# Patient Record
Sex: FEMALE | Race: OTHER | HISPANIC OR LATINO | Employment: PART TIME | URBAN - METROPOLITAN AREA
[De-identification: names, ages, dates, MRNs, and addresses within clinical notes are randomized per-mention and may not be internally consistent; named-entity substitution may affect disease eponyms.]

---

## 2019-04-04 ENCOUNTER — APPOINTMENT (EMERGENCY)
Dept: RADIOLOGY | Facility: HOSPITAL | Age: 64
End: 2019-04-04
Payer: COMMERCIAL

## 2019-04-04 ENCOUNTER — HOSPITAL ENCOUNTER (OUTPATIENT)
Facility: HOSPITAL | Age: 64
Setting detail: OBSERVATION
Discharge: HOME | End: 2019-04-05
Attending: EMERGENCY MEDICINE | Admitting: INTERNAL MEDICINE
Payer: COMMERCIAL

## 2019-04-04 DIAGNOSIS — R55 SYNCOPE: Primary | ICD-10-CM

## 2019-04-04 PROBLEM — E87.6 HYPOKALEMIA: Status: ACTIVE | Noted: 2019-04-04

## 2019-04-04 PROBLEM — R53.1 GENERALIZED WEAKNESS: Status: ACTIVE | Noted: 2019-04-04

## 2019-04-04 LAB
ALBUMIN SERPL BCP-MCNC: 3.7 G/DL (ref 3.5–5)
ALP SERPL-CCNC: 76 U/L (ref 46–116)
ALT SERPL W P-5'-P-CCNC: 31 U/L (ref 12–78)
ANION GAP SERPL CALCULATED.3IONS-SCNC: 8 MMOL/L (ref 4–13)
APTT PPP: 28 SECONDS (ref 26–38)
AST SERPL W P-5'-P-CCNC: 25 U/L (ref 5–45)
BASOPHILS # BLD AUTO: 0.08 THOUSANDS/ΜL (ref 0–0.1)
BASOPHILS NFR BLD AUTO: 1 % (ref 0–1)
BILIRUB DIRECT SERPL-MCNC: 0.1 MG/DL (ref 0–0.2)
BILIRUB SERPL-MCNC: 0.3 MG/DL (ref 0.2–1)
BILIRUB UR QL STRIP: NEGATIVE
BUN SERPL-MCNC: 20 MG/DL (ref 5–25)
CALCIUM SERPL-MCNC: 9.3 MG/DL (ref 8.3–10.1)
CHLORIDE SERPL-SCNC: 104 MMOL/L (ref 100–108)
CLARITY UR: CLEAR
CO2 SERPL-SCNC: 30 MMOL/L (ref 21–32)
COLOR UR: NORMAL
CREAT SERPL-MCNC: 0.67 MG/DL (ref 0.6–1.3)
EOSINOPHIL # BLD AUTO: 0.86 THOUSAND/ΜL (ref 0–0.61)
EOSINOPHIL NFR BLD AUTO: 7 % (ref 0–6)
ERYTHROCYTE [DISTWIDTH] IN BLOOD BY AUTOMATED COUNT: 12.5 % (ref 11.6–15.1)
GFR SERPL CREATININE-BSD FRML MDRD: 93 ML/MIN/1.73SQ M
GLUCOSE SERPL-MCNC: 125 MG/DL (ref 65–140)
GLUCOSE SERPL-MCNC: 84 MG/DL (ref 65–140)
GLUCOSE UR STRIP-MCNC: NEGATIVE MG/DL
HCT VFR BLD AUTO: 42.4 % (ref 34.8–46.1)
HGB BLD-MCNC: 14 G/DL (ref 11.5–15.4)
HGB UR QL STRIP.AUTO: NEGATIVE
IMM GRANULOCYTES # BLD AUTO: 0.03 THOUSAND/UL (ref 0–0.2)
IMM GRANULOCYTES NFR BLD AUTO: 0 % (ref 0–2)
INR PPP: 0.96 (ref 0.86–1.16)
KETONES UR STRIP-MCNC: NEGATIVE MG/DL
LEUKOCYTE ESTERASE UR QL STRIP: NEGATIVE
LIPASE SERPL-CCNC: 139 U/L (ref 73–393)
LYMPHOCYTES # BLD AUTO: 3.49 THOUSANDS/ΜL (ref 0.6–4.47)
LYMPHOCYTES NFR BLD AUTO: 29 % (ref 14–44)
MAGNESIUM SERPL-MCNC: 1.8 MG/DL (ref 1.6–2.6)
MCH RBC QN AUTO: 30.4 PG (ref 26.8–34.3)
MCHC RBC AUTO-ENTMCNC: 33 G/DL (ref 31.4–37.4)
MCV RBC AUTO: 92 FL (ref 82–98)
MONOCYTES # BLD AUTO: 0.83 THOUSAND/ΜL (ref 0.17–1.22)
MONOCYTES NFR BLD AUTO: 7 % (ref 4–12)
NEUTROPHILS # BLD AUTO: 6.68 THOUSANDS/ΜL (ref 1.85–7.62)
NEUTS SEG NFR BLD AUTO: 56 % (ref 43–75)
NITRITE UR QL STRIP: NEGATIVE
NRBC BLD AUTO-RTO: 0 /100 WBCS
NT-PROBNP SERPL-MCNC: 67 PG/ML
PH UR STRIP.AUTO: 7 [PH]
PLATELET # BLD AUTO: 366 THOUSANDS/UL (ref 149–390)
PMV BLD AUTO: 10.4 FL (ref 8.9–12.7)
POTASSIUM SERPL-SCNC: 3 MMOL/L (ref 3.5–5.3)
PROT SERPL-MCNC: 7.5 G/DL (ref 6.4–8.2)
PROT UR STRIP-MCNC: NEGATIVE MG/DL
PROTHROMBIN TIME: 10.1 SECONDS (ref 9.4–11.7)
RBC # BLD AUTO: 4.6 MILLION/UL (ref 3.81–5.12)
SODIUM SERPL-SCNC: 142 MMOL/L (ref 136–145)
SP GR UR STRIP.AUTO: 1.01 (ref 1–1.03)
TROPONIN I SERPL-MCNC: 0.02 NG/ML
TROPONIN I SERPL-MCNC: <0.02 NG/ML
TSH SERPL DL<=0.05 MIU/L-ACNC: 1.65 UIU/ML (ref 0.36–3.74)
UROBILINOGEN UR QL STRIP.AUTO: 0.2 E.U./DL
VIT B12 SERPL-MCNC: 537 PG/ML (ref 100–900)
WBC # BLD AUTO: 11.97 THOUSAND/UL (ref 4.31–10.16)

## 2019-04-04 PROCEDURE — 96374 THER/PROPH/DIAG INJ IV PUSH: CPT

## 2019-04-04 PROCEDURE — 82948 REAGENT STRIP/BLOOD GLUCOSE: CPT

## 2019-04-04 PROCEDURE — 82607 VITAMIN B-12: CPT | Performed by: INTERNAL MEDICINE

## 2019-04-04 PROCEDURE — 83880 ASSAY OF NATRIURETIC PEPTIDE: CPT | Performed by: EMERGENCY MEDICINE

## 2019-04-04 PROCEDURE — 36415 COLL VENOUS BLD VENIPUNCTURE: CPT | Performed by: EMERGENCY MEDICINE

## 2019-04-04 PROCEDURE — 81003 URINALYSIS AUTO W/O SCOPE: CPT | Performed by: EMERGENCY MEDICINE

## 2019-04-04 PROCEDURE — 99285 EMERGENCY DEPT VISIT HI MDM: CPT

## 2019-04-04 PROCEDURE — 93005 ELECTROCARDIOGRAM TRACING: CPT

## 2019-04-04 PROCEDURE — 84484 ASSAY OF TROPONIN QUANT: CPT | Performed by: EMERGENCY MEDICINE

## 2019-04-04 PROCEDURE — 99220 PR INITIAL OBSERVATION CARE/DAY 70 MINUTES: CPT | Performed by: INTERNAL MEDICINE

## 2019-04-04 PROCEDURE — 84443 ASSAY THYROID STIM HORMONE: CPT | Performed by: EMERGENCY MEDICINE

## 2019-04-04 PROCEDURE — 83735 ASSAY OF MAGNESIUM: CPT | Performed by: EMERGENCY MEDICINE

## 2019-04-04 PROCEDURE — 85025 COMPLETE CBC W/AUTO DIFF WBC: CPT | Performed by: EMERGENCY MEDICINE

## 2019-04-04 PROCEDURE — 84484 ASSAY OF TROPONIN QUANT: CPT | Performed by: INTERNAL MEDICINE

## 2019-04-04 PROCEDURE — 71045 X-RAY EXAM CHEST 1 VIEW: CPT

## 2019-04-04 PROCEDURE — 80048 BASIC METABOLIC PNL TOTAL CA: CPT | Performed by: EMERGENCY MEDICINE

## 2019-04-04 PROCEDURE — 80076 HEPATIC FUNCTION PANEL: CPT | Performed by: EMERGENCY MEDICINE

## 2019-04-04 PROCEDURE — 87081 CULTURE SCREEN ONLY: CPT | Performed by: INTERNAL MEDICINE

## 2019-04-04 PROCEDURE — 70450 CT HEAD/BRAIN W/O DYE: CPT

## 2019-04-04 PROCEDURE — 83690 ASSAY OF LIPASE: CPT | Performed by: EMERGENCY MEDICINE

## 2019-04-04 PROCEDURE — 73030 X-RAY EXAM OF SHOULDER: CPT

## 2019-04-04 PROCEDURE — 85730 THROMBOPLASTIN TIME PARTIAL: CPT | Performed by: EMERGENCY MEDICINE

## 2019-04-04 PROCEDURE — 85610 PROTHROMBIN TIME: CPT | Performed by: EMERGENCY MEDICINE

## 2019-04-04 RX ORDER — ONDANSETRON 2 MG/ML
4 INJECTION INTRAMUSCULAR; INTRAVENOUS ONCE
Status: COMPLETED | OUTPATIENT
Start: 2019-04-04 | End: 2019-04-04

## 2019-04-04 RX ORDER — POTASSIUM CHLORIDE 20 MEQ/1
40 TABLET, EXTENDED RELEASE ORAL ONCE
Status: COMPLETED | OUTPATIENT
Start: 2019-04-04 | End: 2019-04-04

## 2019-04-04 RX ORDER — IBUPROFEN 400 MG/1
TABLET ORAL EVERY 6 HOURS PRN
COMMUNITY
End: 2022-04-27

## 2019-04-04 RX ORDER — ACETAMINOPHEN 325 MG/1
650 TABLET ORAL EVERY 6 HOURS PRN
Status: DISCONTINUED | OUTPATIENT
Start: 2019-04-04 | End: 2019-04-05 | Stop reason: HOSPADM

## 2019-04-04 RX ORDER — SODIUM CHLORIDE 9 MG/ML
125 INJECTION, SOLUTION INTRAVENOUS CONTINUOUS
Status: DISCONTINUED | OUTPATIENT
Start: 2019-04-04 | End: 2019-04-05 | Stop reason: HOSPADM

## 2019-04-04 RX ORDER — ONDANSETRON 2 MG/ML
4 INJECTION INTRAMUSCULAR; INTRAVENOUS EVERY 6 HOURS PRN
Status: DISCONTINUED | OUTPATIENT
Start: 2019-04-04 | End: 2019-04-05 | Stop reason: HOSPADM

## 2019-04-04 RX ADMIN — ONDANSETRON 4 MG: 2 INJECTION INTRAMUSCULAR; INTRAVENOUS at 14:21

## 2019-04-04 RX ADMIN — ONDANSETRON 4 MG: 2 INJECTION INTRAMUSCULAR; INTRAVENOUS at 21:58

## 2019-04-04 RX ADMIN — POTASSIUM CHLORIDE 40 MEQ: 1500 TABLET, EXTENDED RELEASE ORAL at 21:43

## 2019-04-04 RX ADMIN — POTASSIUM CHLORIDE 40 MEQ: 1500 TABLET, EXTENDED RELEASE ORAL at 17:21

## 2019-04-04 RX ADMIN — ACETAMINOPHEN 650 MG: 325 TABLET, FILM COATED ORAL at 21:43

## 2019-04-04 RX ADMIN — SODIUM CHLORIDE 125 ML/HR: 0.9 INJECTION, SOLUTION INTRAVENOUS at 17:52

## 2019-04-05 ENCOUNTER — APPOINTMENT (OUTPATIENT)
Dept: NON INVASIVE DIAGNOSTICS | Facility: HOSPITAL | Age: 64
End: 2019-04-05
Payer: COMMERCIAL

## 2019-04-05 VITALS
RESPIRATION RATE: 18 BRPM | HEIGHT: 60 IN | WEIGHT: 150 LBS | OXYGEN SATURATION: 100 % | BODY MASS INDEX: 29.45 KG/M2 | SYSTOLIC BLOOD PRESSURE: 129 MMHG | DIASTOLIC BLOOD PRESSURE: 64 MMHG | HEART RATE: 75 BPM | TEMPERATURE: 97.4 F

## 2019-04-05 LAB
ANION GAP SERPL CALCULATED.3IONS-SCNC: 7 MMOL/L (ref 4–13)
ATRIAL RATE: 70 BPM
BUN SERPL-MCNC: 14 MG/DL (ref 5–25)
CALCIUM SERPL-MCNC: 8.5 MG/DL (ref 8.3–10.1)
CHLORIDE SERPL-SCNC: 109 MMOL/L (ref 100–108)
CHOLEST SERPL-MCNC: 181 MG/DL (ref 50–200)
CO2 SERPL-SCNC: 25 MMOL/L (ref 21–32)
CREAT SERPL-MCNC: 0.65 MG/DL (ref 0.6–1.3)
ERYTHROCYTE [DISTWIDTH] IN BLOOD BY AUTOMATED COUNT: 12.9 % (ref 11.6–15.1)
GFR SERPL CREATININE-BSD FRML MDRD: 94 ML/MIN/1.73SQ M
GLUCOSE P FAST SERPL-MCNC: 98 MG/DL (ref 65–99)
GLUCOSE SERPL-MCNC: 98 MG/DL (ref 65–140)
HCT VFR BLD AUTO: 41 % (ref 34.8–46.1)
HDLC SERPL-MCNC: 48 MG/DL (ref 40–60)
HGB BLD-MCNC: 13.3 G/DL (ref 11.5–15.4)
LDLC SERPL CALC-MCNC: 118 MG/DL (ref 0–100)
MAGNESIUM SERPL-MCNC: 1.8 MG/DL (ref 1.6–2.6)
MCH RBC QN AUTO: 30.5 PG (ref 26.8–34.3)
MCHC RBC AUTO-ENTMCNC: 32.4 G/DL (ref 31.4–37.4)
MCV RBC AUTO: 94 FL (ref 82–98)
NONHDLC SERPL-MCNC: 133 MG/DL
P AXIS: 34 DEGREES
PLATELET # BLD AUTO: 358 THOUSANDS/UL (ref 149–390)
PMV BLD AUTO: 10.2 FL (ref 8.9–12.7)
POTASSIUM SERPL-SCNC: 4.2 MMOL/L (ref 3.5–5.3)
PR INTERVAL: 158 MS
QRS AXIS: -11 DEGREES
QRSD INTERVAL: 80 MS
QT INTERVAL: 404 MS
QTC INTERVAL: 436 MS
RBC # BLD AUTO: 4.36 MILLION/UL (ref 3.81–5.12)
SODIUM SERPL-SCNC: 141 MMOL/L (ref 136–145)
T WAVE AXIS: 25 DEGREES
TRIGL SERPL-MCNC: 74 MG/DL
TROPONIN I SERPL-MCNC: <0.02 NG/ML
VENTRICULAR RATE: 70 BPM
WBC # BLD AUTO: 7.81 THOUSAND/UL (ref 4.31–10.16)

## 2019-04-05 PROCEDURE — 94762 N-INVAS EAR/PLS OXIMTRY CONT: CPT

## 2019-04-05 PROCEDURE — 99244 OFF/OP CNSLTJ NEW/EST MOD 40: CPT | Performed by: INTERNAL MEDICINE

## 2019-04-05 PROCEDURE — 85027 COMPLETE CBC AUTOMATED: CPT | Performed by: INTERNAL MEDICINE

## 2019-04-05 PROCEDURE — 93010 ELECTROCARDIOGRAM REPORT: CPT | Performed by: INTERNAL MEDICINE

## 2019-04-05 PROCEDURE — 93306 TTE W/DOPPLER COMPLETE: CPT

## 2019-04-05 PROCEDURE — 80048 BASIC METABOLIC PNL TOTAL CA: CPT | Performed by: INTERNAL MEDICINE

## 2019-04-05 PROCEDURE — 83735 ASSAY OF MAGNESIUM: CPT | Performed by: INTERNAL MEDICINE

## 2019-04-05 PROCEDURE — 93306 TTE W/DOPPLER COMPLETE: CPT | Performed by: INTERNAL MEDICINE

## 2019-04-05 PROCEDURE — 80061 LIPID PANEL: CPT | Performed by: INTERNAL MEDICINE

## 2019-04-05 PROCEDURE — 99217 PR OBSERVATION CARE DISCHARGE MANAGEMENT: CPT | Performed by: INTERNAL MEDICINE

## 2019-04-05 RX ORDER — KETOROLAC TROMETHAMINE 30 MG/ML
15 INJECTION, SOLUTION INTRAMUSCULAR; INTRAVENOUS EVERY 6 HOURS PRN
Status: DISCONTINUED | OUTPATIENT
Start: 2019-04-05 | End: 2019-04-05 | Stop reason: HOSPADM

## 2019-04-05 RX ADMIN — SODIUM CHLORIDE 125 ML/HR: 0.9 INJECTION, SOLUTION INTRAVENOUS at 01:52

## 2019-04-05 RX ADMIN — KETOROLAC TROMETHAMINE 15 MG: 30 INJECTION, SOLUTION INTRAMUSCULAR at 14:27

## 2019-04-05 RX ADMIN — SODIUM CHLORIDE 125 ML/HR: 0.9 INJECTION, SOLUTION INTRAVENOUS at 10:36

## 2019-04-05 RX ADMIN — ACETAMINOPHEN 650 MG: 325 TABLET, FILM COATED ORAL at 10:02

## 2019-04-06 LAB — MRSA NOSE QL CULT: NORMAL

## 2019-09-09 ENCOUNTER — OFFICE VISIT (OUTPATIENT)
Dept: OBGYN CLINIC | Facility: CLINIC | Age: 64
End: 2019-09-09
Payer: COMMERCIAL

## 2019-09-09 ENCOUNTER — APPOINTMENT (OUTPATIENT)
Dept: RADIOLOGY | Facility: AMBULARY SURGERY CENTER | Age: 64
End: 2019-09-09
Payer: COMMERCIAL

## 2019-09-09 VITALS
WEIGHT: 144 LBS | SYSTOLIC BLOOD PRESSURE: 113 MMHG | HEIGHT: 60 IN | BODY MASS INDEX: 28.27 KG/M2 | HEART RATE: 85 BPM | DIASTOLIC BLOOD PRESSURE: 78 MMHG

## 2019-09-09 DIAGNOSIS — M71.21 BAKER'S CYST OF KNEE, RIGHT: Primary | ICD-10-CM

## 2019-09-09 DIAGNOSIS — M17.11 PRIMARY OSTEOARTHRITIS OF RIGHT KNEE: ICD-10-CM

## 2019-09-09 DIAGNOSIS — M25.561 RIGHT KNEE PAIN, UNSPECIFIED CHRONICITY: ICD-10-CM

## 2019-09-09 PROCEDURE — 73562 X-RAY EXAM OF KNEE 3: CPT

## 2019-09-09 PROCEDURE — 20610 DRAIN/INJ JOINT/BURSA W/O US: CPT | Performed by: ORTHOPAEDIC SURGERY

## 2019-09-09 PROCEDURE — 99204 OFFICE O/P NEW MOD 45 MIN: CPT | Performed by: ORTHOPAEDIC SURGERY

## 2019-09-09 RX ORDER — BETAMETHASONE SODIUM PHOSPHATE AND BETAMETHASONE ACETATE 3; 3 MG/ML; MG/ML
12 INJECTION, SUSPENSION INTRA-ARTICULAR; INTRALESIONAL; INTRAMUSCULAR; SOFT TISSUE
Status: COMPLETED | OUTPATIENT
Start: 2019-09-09 | End: 2019-09-09

## 2019-09-09 RX ORDER — BUPIVACAINE HYDROCHLORIDE 2.5 MG/ML
1 INJECTION, SOLUTION INFILTRATION; PERINEURAL
Status: COMPLETED | OUTPATIENT
Start: 2019-09-09 | End: 2019-09-09

## 2019-09-09 RX ORDER — LIDOCAINE HYDROCHLORIDE 10 MG/ML
1 INJECTION, SOLUTION INFILTRATION; PERINEURAL
Status: COMPLETED | OUTPATIENT
Start: 2019-09-09 | End: 2019-09-09

## 2019-09-09 RX ADMIN — LIDOCAINE HYDROCHLORIDE 1 ML: 10 INJECTION, SOLUTION INFILTRATION; PERINEURAL at 14:05

## 2019-09-09 RX ADMIN — BUPIVACAINE HYDROCHLORIDE 1 ML: 2.5 INJECTION, SOLUTION INFILTRATION; PERINEURAL at 14:05

## 2019-09-09 RX ADMIN — BETAMETHASONE SODIUM PHOSPHATE AND BETAMETHASONE ACETATE 12 MG: 3; 3 INJECTION, SUSPENSION INTRA-ARTICULAR; INTRALESIONAL; INTRAMUSCULAR; SOFT TISSUE at 14:05

## 2019-09-09 NOTE — PROGRESS NOTES
ulAssessment:  1  Baker's cyst of knee, right  US guidance   2  Right knee pain, unspecified chronicity  XR knee 3 vw right non injury   3  Primary osteoarthritis of right knee  Large joint arthrocentesis: R knee     Patient Active Problem List   Diagnosis    Syncope    Hypokalemia    Generalized weakness           Plan      Dorothy Martinez is diagnosed with osteoarthritis of the right knee and a Baker's cyst of the right knee  An ultrasound was ordered of the right lower extremity to evaluate for a Baker's cyst and aspiration  She was give a CSI of the right knee at today's visit  She can resume activity to tolerance post CSI protocol  If symptoms persist, visco lubricant injection series will be ordered, she may also benefit from a medial un  knee brace  She will be seen back in the office on an as needed basis  Subjective:     Patient ID:    Chief Complaint:Brenda Avileserly 58 y o  female      HPI    Patient comes in today with regards to right knee pain  The patient reports that the pain is in the anterior, posterior knee and has been going on for 3 weeks  The pain is rated at3 at its best and7 at its worst   The pain is described as ache  It is worsened with standing from a sitting position and 1st few steps in the morning, and is made better with rest, walking  The patient has taken Advil for treatment        The following portions of the patient's history were reviewed and updated as appropriate: allergies, current medications, past family history, past social history, past surgical history and problem list         Social History     Socioeconomic History    Marital status: /Civil Union     Spouse name: Not on file    Number of children: Not on file    Years of education: Not on file    Highest education level: Not on file   Occupational History    Not on file   Social Needs    Financial resource strain: Not on file    Food insecurity:     Worry: Not on file     Inability: Not on file    Transportation needs:     Medical: Not on file     Non-medical: Not on file   Tobacco Use    Smoking status: Never Smoker    Smokeless tobacco: Never Used   Substance and Sexual Activity    Alcohol use: Never     Frequency: Never    Drug use: Never    Sexual activity: Not on file   Lifestyle    Physical activity:     Days per week: Not on file     Minutes per session: Not on file    Stress: Not on file   Relationships    Social connections:     Talks on phone: Not on file     Gets together: Not on file     Attends [employer] service: Not on file     Active member of club or organization: Not on file     Attends meetings of clubs or organizations: Not on file     Relationship status: Not on file    Intimate partner violence:     Fear of current or ex partner: Not on file     Emotionally abused: Not on file     Physically abused: Not on file     Forced sexual activity: Not on file   Other Topics Concern    Not on file   Social History Narrative    Not on file     Past Medical History:   Diagnosis Date    Chronic pain     L shoulder    Hyperlipidemia      Past Surgical History:   Procedure Laterality Date    HYSTERECTOMY      SMALL [facility]       No Known Allergies  Current Outpatient Medications on File Prior to Visit   Medication Sig Dispense Refill    ibuprofen (MOTRIN) 400 mg tablet Take by mouth every 6 (six) hours as needed for mild pain       No current facility-administered medications on file prior to visit  Objective:    Review of Systems   Constitutional: Negative  HENT: Negative  Eyes: Negative  Respiratory: Negative  Cardiovascular: Negative  Gastrointestinal: Negative  Endocrine: Negative  Genitourinary: Negative  Musculoskeletal: Positive for arthralgias  Negative for joint swelling  Skin: Negative  Allergic/Immunologic: Negative  Neurological: Negative  Hematological: Negative  Psychiatric/Behavioral: Negative  Right Knee Exam     Muscle Strength   The patient has normal right knee strength  Tenderness   Right knee tenderness location: lateral joint line of the patella, midline popliteal space  Range of Motion   Extension: normal   Flexion: normal Right knee flexion: mild crepitus with rom  Tests   Lachman:  Anterior - negative    Posterior - negative  Drawer:  Anterior - negative    Posterior - negative    Other   Erythema: absent  Scars: absent  Sensation: normal  Pulse: present  Swelling: none  Effusion: no effusion present    Comments: Temi's Test:  Pain present posteriorly  Patellar Grind:  Positive  Thesley's Test:  Negative  Mild valgus laxity present  Lower Extremity:  protrusion of varicosities of lower extremity, medial aspect of the knee  Left Knee Exam   Left knee exam is normal     Muscle Strength   The patient has normal left knee strength  Range of Motion   The patient has normal left knee ROM  Left knee extension: crepitus present with rom  Physical Exam   Constitutional: She is oriented to person, place, and time  She appears well-developed  HENT:   Head: Normocephalic  Eyes: Conjunctivae are normal    Neck: Neck supple  Cardiovascular: Intact distal pulses  Pulmonary/Chest: Effort normal    Musculoskeletal:        Right knee: She exhibits no effusion  See right knee exam in note  Neurological: She is alert and oriented to person, place, and time  Skin: Skin is warm and dry  Psychiatric: She has a normal mood and affect         Large joint arthrocentesis: R knee  Date/Time: 9/9/2019 2:05 PM  Consent given by: patient  Site marked: site marked  Timeout: Immediately prior to procedure a time out was called to verify the correct patient, procedure, equipment, support staff and site/side marked as required   Supporting Documentation  Indications: pain and diagnostic evaluation   Procedure Details  Location: knee - R knee  Preparation: Patient was prepped and draped in the usual sterile fashion  Ultrasound guidance: no  Approach: anterolateral  Medications administered: 1 mL bupivacaine 0 25 %; 1 mL lidocaine 1 %; 12 mg betamethasone acetate-betamethasone sodium phosphate 6 (3-3) mg/mL    Patient tolerance: patient tolerated the procedure well with no immediate complications  Dressing:  Sterile dressing applied             I have personally reviewed pertinent films in PACS and my interpretation is xray right knee demonstrates moderate degenerative changes present, with medial joint space narrowing       Scribe Attestation    I,:   Jojo West am acting as a scribe while in the presence of the attending physician :        I,:   Laurel Belle, DO personally performed the services described in this documentation    as scribed in my presence :          Portions of the record may have been created with voice recognition software   Occasional wrong word or "sound a like" substitutions may have occurred due to the inherent limitations of voice recognition software   Read the chart carefully and recognize, using context, where substitutions have occurred

## 2021-02-24 ENCOUNTER — APPOINTMENT (EMERGENCY)
Dept: RADIOLOGY | Facility: HOSPITAL | Age: 66
End: 2021-02-24
Payer: COMMERCIAL

## 2021-02-24 ENCOUNTER — HOSPITAL ENCOUNTER (EMERGENCY)
Facility: HOSPITAL | Age: 66
Discharge: HOME | End: 2021-02-24
Attending: EMERGENCY MEDICINE | Admitting: EMERGENCY MEDICINE
Payer: COMMERCIAL

## 2021-02-24 VITALS
HEART RATE: 56 BPM | WEIGHT: 144 LBS | HEIGHT: 60 IN | OXYGEN SATURATION: 95 % | DIASTOLIC BLOOD PRESSURE: 56 MMHG | BODY MASS INDEX: 28.27 KG/M2 | RESPIRATION RATE: 18 BRPM | SYSTOLIC BLOOD PRESSURE: 98 MMHG

## 2021-02-24 DIAGNOSIS — R07.89 ATYPICAL CHEST PAIN: Primary | ICD-10-CM

## 2021-02-24 DIAGNOSIS — F32.A DEPRESSION: ICD-10-CM

## 2021-02-24 LAB
ALBUMIN SERPL BCP-MCNC: 4.5 G/DL (ref 3.4–4.8)
ALP SERPL-CCNC: 59 U/L (ref 35–140)
ALT SERPL W P-5'-P-CCNC: 15 U/L (ref 5–54)
ANION GAP SERPL CALCULATED.3IONS-SCNC: 9 MMOL/L (ref 4–13)
AST SERPL W P-5'-P-CCNC: 21 U/L (ref 15–41)
BASOPHILS # BLD AUTO: 0.07 THOUSANDS/ΜL (ref 0–0.1)
BASOPHILS NFR BLD AUTO: 1 % (ref 0–1)
BILIRUB SERPL-MCNC: 0.76 MG/DL (ref 0.3–1.2)
BUN SERPL-MCNC: 17 MG/DL (ref 6–20)
CALCIUM SERPL-MCNC: 9.5 MG/DL (ref 8.4–10.2)
CHLORIDE SERPL-SCNC: 102 MMOL/L (ref 96–108)
CO2 SERPL-SCNC: 30 MMOL/L (ref 22–33)
CREAT SERPL-MCNC: 0.63 MG/DL (ref 0.4–1.1)
D DIMER PPP FEU-MCNC: 0.37 MG/L FEU (ref 0.19–0.49)
EOSINOPHIL # BLD AUTO: 0.58 THOUSAND/ΜL (ref 0–0.61)
EOSINOPHIL NFR BLD AUTO: 6 % (ref 0–6)
ERYTHROCYTE [DISTWIDTH] IN BLOOD BY AUTOMATED COUNT: 12.8 % (ref 11.6–15.1)
GFR SERPL CREATININE-BSD FRML MDRD: 94 ML/MIN/1.73SQ M
GLUCOSE SERPL-MCNC: 117 MG/DL (ref 65–140)
HCT VFR BLD AUTO: 45.1 % (ref 34.8–46.1)
HGB BLD-MCNC: 15 G/DL (ref 11.5–15.4)
IMM GRANULOCYTES # BLD AUTO: 0.01 THOUSAND/UL (ref 0–0.2)
IMM GRANULOCYTES NFR BLD AUTO: 0 % (ref 0–2)
LYMPHOCYTES # BLD AUTO: 2.39 THOUSANDS/ΜL (ref 0.6–4.47)
LYMPHOCYTES NFR BLD AUTO: 27 % (ref 14–44)
MCH RBC QN AUTO: 30 PG (ref 26.8–34.3)
MCHC RBC AUTO-ENTMCNC: 33.3 G/DL (ref 31.4–37.4)
MCV RBC AUTO: 90 FL (ref 82–98)
MONOCYTES # BLD AUTO: 0.66 THOUSAND/ΜL (ref 0.17–1.22)
MONOCYTES NFR BLD AUTO: 7 % (ref 4–12)
NEUTROPHILS # BLD AUTO: 5.3 THOUSANDS/ΜL (ref 1.85–7.62)
NEUTS SEG NFR BLD AUTO: 59 % (ref 43–75)
PLATELET # BLD AUTO: 381 THOUSANDS/UL (ref 149–390)
PMV BLD AUTO: 10.4 FL (ref 8.9–12.7)
POTASSIUM SERPL-SCNC: 3.5 MMOL/L (ref 3.5–5)
PROT SERPL-MCNC: 7.2 G/DL (ref 6.4–8.3)
RBC # BLD AUTO: 5 MILLION/UL (ref 3.81–5.12)
SODIUM SERPL-SCNC: 141 MMOL/L (ref 133–145)
TROPONIN I SERPL-MCNC: <0.03 NG/ML (ref 0–0.07)
TROPONIN I SERPL-MCNC: <0.03 NG/ML (ref 0–0.07)
TSH SERPL DL<=0.05 MIU/L-ACNC: 1.27 UIU/ML (ref 0.34–5.6)
WBC # BLD AUTO: 9.01 THOUSAND/UL (ref 4.31–10.16)

## 2021-02-24 PROCEDURE — NC001 PR NO CHARGE: Performed by: PHYSICIAN ASSISTANT

## 2021-02-24 PROCEDURE — 99285 EMERGENCY DEPT VISIT HI MDM: CPT | Performed by: PHYSICIAN ASSISTANT

## 2021-02-24 PROCEDURE — 93005 ELECTROCARDIOGRAM TRACING: CPT

## 2021-02-24 PROCEDURE — 84484 ASSAY OF TROPONIN QUANT: CPT

## 2021-02-24 PROCEDURE — 71045 X-RAY EXAM CHEST 1 VIEW: CPT

## 2021-02-24 PROCEDURE — 80053 COMPREHEN METABOLIC PANEL: CPT | Performed by: EMERGENCY MEDICINE

## 2021-02-24 PROCEDURE — 36415 COLL VENOUS BLD VENIPUNCTURE: CPT | Performed by: PHYSICIAN ASSISTANT

## 2021-02-24 PROCEDURE — 99285 EMERGENCY DEPT VISIT HI MDM: CPT

## 2021-02-24 PROCEDURE — 85025 COMPLETE CBC W/AUTO DIFF WBC: CPT | Performed by: EMERGENCY MEDICINE

## 2021-02-24 PROCEDURE — 84443 ASSAY THYROID STIM HORMONE: CPT | Performed by: EMERGENCY MEDICINE

## 2021-02-24 PROCEDURE — 84484 ASSAY OF TROPONIN QUANT: CPT | Performed by: EMERGENCY MEDICINE

## 2021-02-24 PROCEDURE — 85379 FIBRIN DEGRADATION QUANT: CPT | Performed by: EMERGENCY MEDICINE

## 2021-02-24 NOTE — Clinical Note
Eloy Alexander was seen and treated in our emergency department on 2/24/2021  Diagnosis:     Ernestine Mujica  may return to work on return date  She may return on this date: 03/01/2021         If you have any questions or concerns, please don't hesitate to call        Safia Casiano MD    ______________________________           _______________          _______________  Hospital Representative                              Date                                Time

## 2021-02-25 LAB
ATRIAL RATE: 57 BPM
P AXIS: 35 DEGREES
PR INTERVAL: 165 MS
QRS AXIS: -17 DEGREES
QRSD INTERVAL: 83 MS
QT INTERVAL: 411 MS
QTC INTERVAL: 401 MS
T WAVE AXIS: 29 DEGREES
VENTRICULAR RATE: 57 BPM

## 2021-02-25 PROCEDURE — 93010 ELECTROCARDIOGRAM REPORT: CPT | Performed by: INTERNAL MEDICINE

## 2021-02-25 NOTE — ED NOTES
Patient reports increasing anxiety/depression  Denies SI or HI but states she does ask God to end her life naturally sometimes   Patient states she is able to go to work     Barry Hicks RN  02/24/21 1926

## 2021-02-25 NOTE — ED NOTES
Plan for recheck of TROPONIN at 3 hr laxmi, as long as negative, pending Reed Flynn RN  02/24/21 4306

## 2021-02-25 NOTE — ED NOTES
Patient asleep at this time, no distress noted   Repeat EKG completed, pending result of repeat troponin          Brandan Fischer RN  02/24/21 8611

## 2021-02-25 NOTE — ED PROVIDER NOTES
History  Chief Complaint   Patient presents with    Chest Pain     patient reports intermittent LEFT sided chest pain  denies current pain but patient states chest feels "weird"     HPI    Prior to Admission Medications   Prescriptions Last Dose Informant Patient Reported? Taking?   ibuprofen (MOTRIN) 400 mg tablet  Self Yes No   Sig: Take by mouth every 6 (six) hours as needed for mild pain      Facility-Administered Medications: None       Past Medical History:   Diagnosis Date    Chronic pain     L shoulder    Hyperlipidemia        Past Surgical History:   Procedure Laterality Date    HYSTERECTOMY      SMALL INTESTINE SURGERY      VARICOSE VEIN SURGERY         Family History   Problem Relation Age of Onset    Lung cancer Mother     No Known Problems Father      I have reviewed and agree with the history as documented  E-Cigarette/Vaping     E-Cigarette/Vaping Substances     Social History     Tobacco Use    Smoking status: Never Smoker    Smokeless tobacco: Never Used   Substance Use Topics    Alcohol use: Never     Frequency: Never    Drug use: Never       Review of Systems    Physical Exam  Physical Exam    Vital Signs  ED Triage Vitals [02/24/21 1907]   Temp Pulse Respirations Blood Pressure SpO2   -- 82 18 128/72 100 %      Temp src Heart Rate Source Patient Position - Orthostatic VS BP Location FiO2 (%)   -- Monitor Lying Left arm --      Pain Score       --           Vitals:    02/24/21 1907 02/24/21 2004 02/24/21 2223   BP: 128/72 116/71 98/56   Pulse: 82 65 56   Patient Position - Orthostatic VS: Lying Lying Lying         Visual Acuity      ED Medications  Medications - No data to display    Diagnostic Studies  Results Reviewed     Procedure Component Value Units Date/Time    Troponin I [[id number]] Collected: 02/24/21 2226    Lab Status:  In process Specimen: Blood from Arm, Right Updated: 02/24/21 2228    TSH [[id number]]  (Normal) Collected: 02/24/21 1923    Lab Status: Final result Specimen: Blood from Arm, Right Updated: 02/24/21 2001     TSH 3RD GENERATON 1 267 uIU/mL     Narrative:      Patients undergoing fluorescein dye angiography may retain small amounts of fluorescein in the body for 48-72 hours post procedure  Samples containing fluorescein can produce falsely depressed TSH values  If the patient had this procedure,a specimen should be resubmitted post fluorescein clearance        D-dimer, quantitative [[id number]]  (Normal) Collected: 02/24/21 1923    Lab Status: Final result Specimen: Blood from Arm, Right Updated: 02/24/21 1950     D-Dimer, Quant  0 37 mg/L FEU     Troponin I [[id number]]  (Normal) Collected: 02/24/21 1923    Lab Status: Final result Specimen: Blood from Arm, Right Updated: 02/24/21 1949     Troponin I <0 03 ng/mL     Comprehensive metabolic panel [[id number]] Collected: 02/24/21 1923    Lab Status: Final result Specimen: Blood from Arm, Right Updated: 02/24/21 1948     Sodium 141 mmol/L      Potassium 3 5 mmol/L      Chloride 102 mmol/L      CO2 30 mmol/L      ANION GAP 9 mmol/L      BUN 17 mg/dL      Creatinine 0 63 mg/dL      Glucose 117 mg/dL      Calcium 9 5 mg/dL      AST 21 U/L      ALT 15 U/L      Alkaline Phosphatase 59 0 U/L      Total Protein 7 2 g/dL      Albumin 4 5 g/dL      Total Bilirubin 0 76 mg/dL      eGFR 94 ml/min/1 73sq m     Narrative:      [facility] guidelines for Chronic Kidney Disease (CKD):     Stage 1 with normal or high GFR (GFR > 90 mL/min/1 73 square meters)    Stage 2 Mild CKD (GFR = 60-89 mL/min/1 73 square meters)    Stage 3A Moderate CKD (GFR = 45-59 mL/min/1 73 square meters)    Stage 3B Moderate CKD (GFR = 30-44 mL/min/1 73 square meters)    Stage 4 Severe CKD (GFR = 15-29 mL/min/1 73 square meters)    Stage 5 End Stage CKD (GFR <15 mL/min/1 73 square meters)  Note: GFR calculation is accurate only with a steady state creatinine    CBC and differential [[id number]]  (Normal) Collected: 02/24/21 1923    Lab Status: Final result Specimen: Blood from Arm, Right Updated: 02/24/21 1930     WBC 9 01 Thousand/uL      RBC 5 00 Million/uL      Hemoglobin 15 0 g/dL      Hematocrit 45 1 %      MCV 90 fL      MCH 30 0 pg      MCHC 33 3 g/dL      RDW 12 8 %      MPV 10 4 fL      Platelets 066 Thousands/uL      Neutrophils Relative 59 %      Immat GRANS % 0 %      Lymphocytes Relative 27 %      Monocytes Relative 7 %      Eosinophils Relative 6 %      Basophils Relative 1 %      Neutrophils Absolute 5 30 Thousands/µL      Immature Grans Absolute 0 01 Thousand/uL      Lymphocytes Absolute 2 39 Thousands/µL      Monocytes Absolute 0 66 Thousand/µL      Eosinophils Absolute 0 58 Thousand/µL      Basophils Absolute 0 07 Thousands/µL                  XR chest 1 view portable   ED Interpretation by Chaparrita Bolton PA-C (02/24 1951)   No acute cardiopulmonary process                 Procedures  ECG 12 Lead Documentation Only    Date/Time: 2/24/2021 10:36 PM  Performed by: Dorothy Benavides MD  Authorized by: Dorothy Benavides MD     ECG reviewed by me, the ED Provider: yes    Patient location:  ED  Comments:      Sinus bradycardia 57 beats per minute  Borderline left axis deviation  No acute ischemic ST or T-wave changes  ED Course  ED Course as of Feb 25 0533   Wed Feb 24, 2021   2259 Delta EKG and troponin do not suggest ischemia                  HEART Risk Score      Most Recent Value   Heart Score Risk Calculator   History  0 Filed at: 02/24/2021 2057   ECG  0 Filed at: 02/24/2021 2057   Age  2 Filed at: 02/24/2021 2057   Risk Factors  0 Filed at: 02/24/2021 2057   Troponin  0 Filed at: 02/24/2021 2057   HEART Score  2 Filed at: 02/24/2021 2057                                    MDM  Number of Diagnoses or Management Options  Atypical chest pain:   Depression:       Disposition  Final diagnoses:   Atypical chest pain   Depression     Time reflects when diagnosis was documented in both MDM as applicable and the Disposition within this note     Time User Action Codes Description Comment    2/24/2021  9:00 PM Shavon Jameson Add [R07 89] Atypical chest pain     2/24/2021  9:00 PM Shavon Jameson Add [F32 9] Depression       ED Disposition     None      Follow-up Information    None         Patient's Medications   Discharge Prescriptions    No medications on file     No discharge procedures on file      PDMP Review     None          ED Provider  Electronically Signed by           Alis Maddox MD  02/25/21 4742

## 2021-02-25 NOTE — ED PROVIDER NOTES
History  Chief Complaint   Patient presents with    Chest Pain     patient reports intermittent LEFT sided chest pain  denies current pain but patient states chest feels "weird"     78-year-old female comes in today complaining of left-sided chest pain that she describes as a sense of depression or gloom that began about a 1/2 hour prior to arrival increasingly got worse over about a 2 minutes time frame and then resolved on its own  She did have some associated palpitations  Patient reports that she now has a weird sensation in the left side of her chest   She reports that she was texting her sister about her home situation when the chest pain began  She reports increased home stressors recently that have been causing her to feel depressed  She reports that her  is an alcoholic and her daughter is nothing but trouble    She denies any suicidal ideation  Prior to Admission Medications   Prescriptions Last Dose Informant Patient Reported? Taking?   ibuprofen (MOTRIN) 400 mg tablet  Self Yes No   Sig: Take by mouth every 6 (six) hours as needed for mild pain      Facility-Administered Medications: None       Past Medical History:   Diagnosis Date    Chronic pain     L shoulder    Hyperlipidemia        Past Surgical History:   Procedure Laterality Date    HYSTERECTOMY      SMALL INTESTINE SURGERY      VARICOSE VEIN SURGERY         Family History   Problem Relation Age of Onset    Lung cancer Mother     No Known Problems Father      I have reviewed and agree with the history as documented  E-Cigarette/Vaping     E-Cigarette/Vaping Substances     Social History     Tobacco Use    Smoking status: Never Smoker    Smokeless tobacco: Never Used   Substance Use Topics    Alcohol use: Never     Frequency: Never    Drug use: Never       Review of Systems   Constitutional: Negative for fever  HENT: Negative for nosebleeds  Eyes: Negative for redness     Respiratory: Negative for shortness of breath  Cardiovascular: Negative for chest pain  Gastrointestinal: Negative for blood in stool  Genitourinary: Negative for hematuria  Musculoskeletal: Negative for gait problem  Skin: Negative for rash  Neurological: Negative for seizures  Psychiatric/Behavioral: Negative for behavioral problems  Physical Exam  Physical Exam  Constitutional:       Appearance: Normal appearance  She is obese  HENT:      Head: Normocephalic and atraumatic  Nose: No rhinorrhea  Mouth/Throat:      Mouth: Mucous membranes are moist    Eyes:      Extraocular Movements: Extraocular movements intact  Neck:      Musculoskeletal: Normal range of motion  Cardiovascular:      Rate and Rhythm: Normal rate and regular rhythm  Pulmonary:      Effort: Pulmonary effort is normal       Breath sounds: Normal breath sounds  Chest:      Chest wall: Tenderness (Does not reproduce complaint) present  Abdominal:      Palpations: Abdomen is soft  Musculoskeletal: Normal range of motion  Skin:     General: Skin is warm and dry  Neurological:      General: No focal deficit present  Mental Status: She is alert  Psychiatric:         Mood and Affect: Mood is depressed (Occasionally)           Behavior: Behavior normal          Vital Signs  ED Triage Vitals [02/24/21 1907]   Temp Pulse Respirations Blood Pressure SpO2   -- 82 18 128/72 100 %      Temp src Heart Rate Source Patient Position - Orthostatic VS BP Location FiO2 (%)   -- Monitor Lying Left arm --      Pain Score       --           Vitals:    02/24/21 1907 02/24/21 2004   BP: 128/72 116/71   Pulse: 82 65   Patient Position - Orthostatic VS: Lying Lying         Visual Acuity      ED Medications  Medications - No data to display    Diagnostic Studies  Results Reviewed     Procedure Component Value Units Date/Time    TSH [[id number]]  (Normal) Collected: 02/24/21 1923    Lab Status: Final result Specimen: Blood from Arm, Right Updated: 02/24/21 2001 TSH 3RD GENERATON 1 267 uIU/mL     Narrative:      Patients undergoing fluorescein dye angiography may retain small amounts of fluorescein in the body for 48-72 hours post procedure  Samples containing fluorescein can produce falsely depressed TSH values  If the patient had this procedure,a specimen should be resubmitted post fluorescein clearance        D-dimer, quantitative [[id number]]  (Normal) Collected: 02/24/21 1923    Lab Status: Final result Specimen: Blood from Arm, Right Updated: 02/24/21 1950     D-Dimer, Quant  0 37 mg/L FEU     Troponin I [[id number]]  (Normal) Collected: 02/24/21 1923    Lab Status: Final result Specimen: Blood from Arm, Right Updated: 02/24/21 1949     Troponin I <0 03 ng/mL     Comprehensive metabolic panel [[id number]] Collected: 02/24/21 1923    Lab Status: Final result Specimen: Blood from Arm, Right Updated: 02/24/21 1948     Sodium 141 mmol/L      Potassium 3 5 mmol/L      Chloride 102 mmol/L      CO2 30 mmol/L      ANION GAP 9 mmol/L      BUN 17 mg/dL      Creatinine 0 63 mg/dL      Glucose 117 mg/dL      Calcium 9 5 mg/dL      AST 21 U/L      ALT 15 U/L      Alkaline Phosphatase 59 0 U/L      Total Protein 7 2 g/dL      Albumin 4 5 g/dL      Total Bilirubin 0 76 mg/dL      eGFR 94 ml/min/1 73sq m     Narrative:      [facility] guidelines for Chronic Kidney Disease (CKD):     Stage 1 with normal or high GFR (GFR > 90 mL/min/1 73 square meters)    Stage 2 Mild CKD (GFR = 60-89 mL/min/1 73 square meters)    Stage 3A Moderate CKD (GFR = 45-59 mL/min/1 73 square meters)    Stage 3B Moderate CKD (GFR = 30-44 mL/min/1 73 square meters)    Stage 4 Severe CKD (GFR = 15-29 mL/min/1 73 square meters)    Stage 5 End Stage CKD (GFR <15 mL/min/1 73 square meters)  Note: GFR calculation is accurate only with a steady state creatinine    CBC and differential [[id number]]  (Normal) Collected: 02/24/21 1923    Lab Status: Final result Specimen: Blood from Arm, Right Updated: 02/24/21 1930     WBC 9 01 Thousand/uL      RBC 5 00 Million/uL      Hemoglobin 15 0 g/dL      Hematocrit 45 1 %      MCV 90 fL      MCH 30 0 pg      MCHC 33 3 g/dL      RDW 12 8 %      MPV 10 4 fL      Platelets 043 Thousands/uL      Neutrophils Relative 59 %      Immat GRANS % 0 %      Lymphocytes Relative 27 %      Monocytes Relative 7 %      Eosinophils Relative 6 %      Basophils Relative 1 %      Neutrophils Absolute 5 30 Thousands/µL      Immature Grans Absolute 0 01 Thousand/uL      Lymphocytes Absolute 2 39 Thousands/µL      Monocytes Absolute 0 66 Thousand/µL      Eosinophils Absolute 0 58 Thousand/µL      Basophils Absolute 0 07 Thousands/µL                  XR chest 1 view portable   ED Interpretation by Deborah Allen PA-C (02/24 1951)   No acute cardiopulmonary process                 Procedures  Procedures         ED Course  ED Course as of Feb 24 2108   Wed Feb 24, 2021 2058 EKG performed at 7:00 p m   Interpreted by me shows normal sinus rhythm with rate 86, borderline left axis deviation, no ectopy, no significant ST elevation      2105 Patient care transitioned to Dr Srinivas Craig pending repeat trop                HEART Risk Score      Most Recent Value   Heart Score Risk Calculator   History  0 Filed at: 02/24/2021 2057   ECG  0 Filed at: 02/24/2021 2057   Age  2 Filed at: 02/24/2021 2057   Risk Factors  0 Filed at: 02/24/2021 2057   Troponin  0 Filed at: 02/24/2021 2057   HEART Score  2 Filed at: 02/24/2021 2057                                    MDM    Disposition  Final diagnoses:   Atypical chest pain   Depression     Time reflects when diagnosis was documented in both MDM as applicable and the Disposition within this note     Time User Action Codes Description Comment    2/24/2021  9:00 PM Shahrzad Abebe Add [R07 89] Atypical chest pain     2/24/2021  9:00 PM Shahrzad Abebe Add [F32 9] Depression       ED Disposition     None      Follow-up Information    None Patient's Medications   Discharge Prescriptions    No medications on file     No discharge procedures on file      PDMP Review     None          ED Provider  Electronically Signed by           Heron Harding PA-C  02/24/21 2106       Heron Harding PA-C  02/24/21 2107       Heron Harding PA-C  02/24/21 2108

## 2021-11-02 ENCOUNTER — APPOINTMENT (OUTPATIENT)
Dept: LAB | Facility: HOSPITAL | Age: 66
End: 2021-11-02
Payer: COMMERCIAL

## 2021-11-02 DIAGNOSIS — E78.5 HYPERLIPIDEMIA, UNSPECIFIED HYPERLIPIDEMIA TYPE: ICD-10-CM

## 2021-11-02 DIAGNOSIS — R53.83 FATIGUE, UNSPECIFIED TYPE: ICD-10-CM

## 2021-11-02 LAB
25(OH)D3 SERPL-MCNC: 32.7 NG/ML (ref 30–100)
ALBUMIN SERPL BCP-MCNC: 4.1 G/DL (ref 3.4–4.8)
ALP SERPL-CCNC: 63.6 U/L (ref 35–140)
ALT SERPL W P-5'-P-CCNC: 19 U/L (ref 5–54)
ANION GAP SERPL CALCULATED.3IONS-SCNC: 5 MMOL/L (ref 4–13)
AST SERPL W P-5'-P-CCNC: 20 U/L (ref 15–41)
BASOPHILS # BLD AUTO: 0.06 THOUSANDS/ΜL (ref 0–0.1)
BASOPHILS NFR BLD AUTO: 1 % (ref 0–1)
BILIRUB SERPL-MCNC: 0.45 MG/DL (ref 0.3–1.2)
BUN SERPL-MCNC: 20 MG/DL (ref 6–20)
CALCIUM SERPL-MCNC: 9.5 MG/DL (ref 8.4–10.2)
CHLORIDE SERPL-SCNC: 104 MMOL/L (ref 96–108)
CHOLEST SERPL-MCNC: 228 MG/DL
CO2 SERPL-SCNC: 31 MMOL/L (ref 22–33)
CREAT SERPL-MCNC: 0.67 MG/DL (ref 0.4–1.1)
EOSINOPHIL # BLD AUTO: 0.59 THOUSAND/ΜL (ref 0–0.61)
EOSINOPHIL NFR BLD AUTO: 10 % (ref 0–6)
ERYTHROCYTE [DISTWIDTH] IN BLOOD BY AUTOMATED COUNT: 13.1 % (ref 11.6–15.1)
GFR SERPL CREATININE-BSD FRML MDRD: 91 ML/MIN/1.73SQ M
GLUCOSE P FAST SERPL-MCNC: 83 MG/DL (ref 70–105)
HCT VFR BLD AUTO: 44 % (ref 34.8–46.1)
HDLC SERPL-MCNC: 57 MG/DL
HGB BLD-MCNC: 14.6 G/DL (ref 11.5–15.4)
IMM GRANULOCYTES # BLD AUTO: 0.01 THOUSAND/UL (ref 0–0.2)
IMM GRANULOCYTES NFR BLD AUTO: 0 % (ref 0–2)
LDLC SERPL CALC-MCNC: 152 MG/DL (ref 0–100)
LYMPHOCYTES # BLD AUTO: 1.74 THOUSANDS/ΜL (ref 0.6–4.47)
LYMPHOCYTES NFR BLD AUTO: 29 % (ref 14–44)
MCH RBC QN AUTO: 30.1 PG (ref 26.8–34.3)
MCHC RBC AUTO-ENTMCNC: 33.2 G/DL (ref 31.4–37.4)
MCV RBC AUTO: 91 FL (ref 82–98)
MONOCYTES # BLD AUTO: 0.59 THOUSAND/ΜL (ref 0.17–1.22)
MONOCYTES NFR BLD AUTO: 10 % (ref 4–12)
NEUTROPHILS # BLD AUTO: 3.02 THOUSANDS/ΜL (ref 1.85–7.62)
NEUTS SEG NFR BLD AUTO: 50 % (ref 43–75)
NONHDLC SERPL-MCNC: 171 MG/DL
PLATELET # BLD AUTO: 394 THOUSANDS/UL (ref 149–390)
PMV BLD AUTO: 10.4 FL (ref 8.9–12.7)
POTASSIUM SERPL-SCNC: 4.4 MMOL/L (ref 3.5–5)
PROT SERPL-MCNC: 7.2 G/DL (ref 6.4–8.3)
RBC # BLD AUTO: 4.85 MILLION/UL (ref 3.81–5.12)
SODIUM SERPL-SCNC: 140 MMOL/L (ref 133–145)
TRIGL SERPL-MCNC: 93.7 MG/DL
WBC # BLD AUTO: 6.01 THOUSAND/UL (ref 4.31–10.16)

## 2021-11-02 PROCEDURE — 85025 COMPLETE CBC W/AUTO DIFF WBC: CPT

## 2021-11-02 PROCEDURE — 80061 LIPID PANEL: CPT

## 2021-11-02 PROCEDURE — 36415 COLL VENOUS BLD VENIPUNCTURE: CPT

## 2021-11-02 PROCEDURE — 80053 COMPREHEN METABOLIC PANEL: CPT

## 2021-11-02 PROCEDURE — 82306 VITAMIN D 25 HYDROXY: CPT

## 2021-12-21 ENCOUNTER — HOSPITAL ENCOUNTER (EMERGENCY)
Facility: HOSPITAL | Age: 66
Discharge: HOME | End: 2021-12-21
Attending: EMERGENCY MEDICINE | Admitting: EMERGENCY MEDICINE
Payer: COMMERCIAL

## 2021-12-21 VITALS
DIASTOLIC BLOOD PRESSURE: 54 MMHG | HEART RATE: 60 BPM | RESPIRATION RATE: 16 BRPM | HEIGHT: 60 IN | BODY MASS INDEX: 28.13 KG/M2 | TEMPERATURE: 98.2 F | SYSTOLIC BLOOD PRESSURE: 114 MMHG | OXYGEN SATURATION: 100 % | WEIGHT: 143.3 LBS

## 2021-12-21 DIAGNOSIS — K59.00 CONSTIPATION: Primary | ICD-10-CM

## 2021-12-21 PROCEDURE — 99284 EMERGENCY DEPT VISIT MOD MDM: CPT | Performed by: EMERGENCY MEDICINE

## 2021-12-21 PROCEDURE — 99283 EMERGENCY DEPT VISIT LOW MDM: CPT

## 2021-12-21 NOTE — ED PROVIDER NOTES
History  Chief Complaint   Patient presents with    Constipation     Pt presents to ED from home w/o BM for one week  Pt states having problems in the past w/ constipation  PT took OTC meds w/o help  Constipation  Severity:  Severe  Time since last bowel movement:  7 days  Timing:  Rare  Progression:  Unchanged  Chronicity:  New  Context: not dehydration, not dietary changes, not medication, not narcotics and not stress    Stool description:  Formed  Relieved by:  Nothing  Ineffective treatments:  Laxatives and stool softeners  Associated symptoms: abdominal pain    Associated symptoms: no anorexia, no back pain, no diarrhea, no dysuria, no fever, no hematochezia, no nausea, no urinary retention and no vomiting    Abdominal pain:     Location:  Generalized    Quality: fullness      Severity:  Mild    Progression:  Unchanged      Prior to Admission Medications   Prescriptions Last Dose Informant Patient Reported? Taking?   ibuprofen (MOTRIN) 400 mg tablet  Self Yes No   Sig: Take by mouth every 6 (six) hours as needed for mild pain      Facility-Administered Medications: None       Past Medical History:   Diagnosis Date    Chronic pain     L shoulder    Hyperlipidemia        Past Surgical History:   Procedure Laterality Date    HYSTERECTOMY      SMALL INTESTINE SURGERY      VARICOSE VEIN SURGERY         Family History   Problem Relation Age of Onset    Lung cancer Mother     No Known Problems Father      I have reviewed and agree with the history as documented  E-Cigarette/Vaping    E-Cigarette Use Never User      E-Cigarette/Vaping Substances     Social History     Tobacco Use    Smoking status: Never Smoker    Smokeless tobacco: Never Used   Vaping Use    Vaping Use: Never used   Substance Use Topics    Alcohol use: Never    Drug use: Never       Review of Systems   Constitutional: Negative for chills and fever  HENT: Negative for ear pain and sore throat      Eyes: Negative for pain and visual disturbance  Respiratory: Negative for cough and shortness of breath  Cardiovascular: Negative for chest pain and palpitations  Gastrointestinal: Positive for abdominal pain and constipation  Negative for abdominal distention, anorexia, blood in stool, diarrhea, hematochezia, nausea, rectal pain and vomiting  Genitourinary: Negative for dysuria and hematuria  Musculoskeletal: Negative for arthralgias and back pain  Skin: Negative for color change and rash  Neurological: Negative for seizures and syncope  All other systems reviewed and are negative  Physical Exam  Physical Exam  Vitals and nursing note reviewed  Constitutional:       General: She is not in acute distress  Appearance: She is well-developed  She is not ill-appearing  HENT:      Head: Normocephalic and atraumatic  Eyes:      Conjunctiva/sclera: Conjunctivae normal    Cardiovascular:      Rate and Rhythm: Normal rate and regular rhythm  Heart sounds: No murmur heard  Pulmonary:      Effort: Pulmonary effort is normal  No respiratory distress  Breath sounds: Normal breath sounds  Abdominal:      General: Bowel sounds are normal  There is no distension  Palpations: Abdomen is soft  Tenderness: There is generalized abdominal tenderness  There is no right CVA tenderness, left CVA tenderness, guarding or rebound  Musculoskeletal:      Cervical back: Neck supple  Skin:     General: Skin is warm and dry  Capillary Refill: Capillary refill takes less than 2 seconds  Coloration: Skin is not jaundiced or pale  Neurological:      Mental Status: She is alert           Vital Signs  ED Triage Vitals [12/21/21 0448]   Temperature Pulse Respirations Blood Pressure SpO2   98 2 °F (36 8 °C) 60 16 114/54 100 %      Temp Source Heart Rate Source Patient Position - Orthostatic VS BP Location FiO2 (%)   Oral -- -- -- --      Pain Score       9           Vitals:    12/21/21 0448   BP: 114/54 Pulse: 60         Visual Acuity      ED Medications  Medications - No data to display    Diagnostic Studies  Results Reviewed     None                 No orders to display              Procedures  Procedures         ED Course                                             MDM  Number of Diagnoses or Management Options  Risk of Complications, Morbidity, and/or Mortality  General comments: VSS  Patient with a significant bowel movement after soapsuds enema  On bedside re-evaluation patient reports she "feels much better "  Reports her abdominal pain has completely resolved  Advised patient to follow-up with her primary care provider for re-evaluation further management  Advised to continue MiraLax as needed for constipation  Strict return precautions verbally communicated to the patient as outlined in the AVS   All patient questions and concerns were answered  Patient verbally communicated their understanding and agreement to the above plan  Patient Progress  Patient progress: stable      Disposition  Final diagnoses:   Constipation     Time reflects when diagnosis was documented in both MDM as applicable and the Disposition within this note     Time User Action Codes Description Comment    12/21/2021  6:46 AM Kathy Warren Add [K59 00] Constipation       ED Disposition     ED Disposition Condition Date/Time Comment    Discharge Stable Tue Dec 21, 2021  6:46 AM Satish Hartman discharge to home/self care  Follow-up Information     Follow up With Specialties Details Why Contact Info    [employer]  Call  As needed, For a primary care provider [phone]            Patient's Medications   Discharge Prescriptions    No medications on file       No discharge procedures on file      PDMP Review     None          ED Provider  Electronically Signed by           Sergio Willis PA-C  12/21/21 Valeria Velazquez 45 Bernadette Bagley MD  12/21/21 2349

## 2021-12-21 NOTE — DISCHARGE INSTRUCTIONS
Please return to the ED for any concerns as outlined in the AVS or for any other concerns  Continue MiraLax as needed for constipation  Please follow-up with your primary care provider for re-evaluation and further management

## 2022-01-08 ENCOUNTER — APPOINTMENT (OUTPATIENT)
Dept: LAB | Facility: HOSPITAL | Age: 67
End: 2022-01-08
Payer: COMMERCIAL

## 2022-01-08 ENCOUNTER — TRANSCRIBE ORDERS (OUTPATIENT)
Dept: LAB | Facility: HOSPITAL | Age: 67
End: 2022-01-08

## 2022-01-08 DIAGNOSIS — R31.9 HEMATURIA SYNDROME: ICD-10-CM

## 2022-01-08 DIAGNOSIS — R31.9 HEMATURIA SYNDROME: Primary | ICD-10-CM

## 2022-01-08 LAB
ALBUMIN SERPL BCP-MCNC: 4.1 G/DL (ref 3.4–4.8)
ALP SERPL-CCNC: 74.7 U/L (ref 35–140)
ALT SERPL W P-5'-P-CCNC: 46 U/L (ref 5–54)
ANION GAP SERPL CALCULATED.3IONS-SCNC: 7 MMOL/L (ref 4–13)
AST SERPL W P-5'-P-CCNC: 22 U/L (ref 15–41)
BACTERIA UR QL AUTO: ABNORMAL /HPF
BASOPHILS # BLD AUTO: 0.07 THOUSANDS/ΜL (ref 0–0.1)
BASOPHILS NFR BLD AUTO: 1 % (ref 0–1)
BILIRUB SERPL-MCNC: 0.69 MG/DL (ref 0.3–1.2)
BILIRUB UR QL STRIP: NEGATIVE
BUN SERPL-MCNC: 14 MG/DL (ref 6–20)
CALCIUM SERPL-MCNC: 9.1 MG/DL (ref 8.4–10.2)
CHLORIDE SERPL-SCNC: 102 MMOL/L (ref 96–108)
CLARITY UR: CLEAR
CO2 SERPL-SCNC: 31 MMOL/L (ref 22–33)
COLOR UR: YELLOW
CREAT SERPL-MCNC: 0.61 MG/DL (ref 0.4–1.1)
EOSINOPHIL # BLD AUTO: 0.61 THOUSAND/ΜL (ref 0–0.61)
EOSINOPHIL NFR BLD AUTO: 8 % (ref 0–6)
ERYTHROCYTE [DISTWIDTH] IN BLOOD BY AUTOMATED COUNT: 12.3 % (ref 11.6–15.1)
GFR SERPL CREATININE-BSD FRML MDRD: 94 ML/MIN/1.73SQ M
GLUCOSE P FAST SERPL-MCNC: 87 MG/DL (ref 70–105)
GLUCOSE UR STRIP-MCNC: NEGATIVE MG/DL
HCT VFR BLD AUTO: 44.8 % (ref 34.8–46.1)
HGB BLD-MCNC: 14.7 G/DL (ref 11.5–15.4)
HGB UR QL STRIP.AUTO: ABNORMAL
IMM GRANULOCYTES # BLD AUTO: 0.02 THOUSAND/UL (ref 0–0.2)
IMM GRANULOCYTES NFR BLD AUTO: 0 % (ref 0–2)
KETONES UR STRIP-MCNC: NEGATIVE MG/DL
LEUKOCYTE ESTERASE UR QL STRIP: ABNORMAL
LYMPHOCYTES # BLD AUTO: 2.2 THOUSANDS/ΜL (ref 0.6–4.47)
LYMPHOCYTES NFR BLD AUTO: 30 % (ref 14–44)
MCH RBC QN AUTO: 29.7 PG (ref 26.8–34.3)
MCHC RBC AUTO-ENTMCNC: 32.8 G/DL (ref 31.4–37.4)
MCV RBC AUTO: 91 FL (ref 82–98)
MONOCYTES # BLD AUTO: 0.61 THOUSAND/ΜL (ref 0.17–1.22)
MONOCYTES NFR BLD AUTO: 8 % (ref 4–12)
NEUTROPHILS # BLD AUTO: 3.78 THOUSANDS/ΜL (ref 1.85–7.62)
NEUTS SEG NFR BLD AUTO: 53 % (ref 43–75)
NITRITE UR QL STRIP: NEGATIVE
NON-SQ EPI CELLS URNS QL MICRO: ABNORMAL /HPF
NRBC BLD AUTO-RTO: 0 /100 WBCS
PH UR STRIP.AUTO: 7 [PH]
PLATELET # BLD AUTO: 471 THOUSANDS/UL (ref 149–390)
PMV BLD AUTO: 9.9 FL (ref 8.9–12.7)
POTASSIUM SERPL-SCNC: 3.8 MMOL/L (ref 3.5–5)
PROT SERPL-MCNC: 7 G/DL (ref 6.4–8.3)
PROT UR STRIP-MCNC: NEGATIVE MG/DL
RBC # BLD AUTO: 4.95 MILLION/UL (ref 3.81–5.12)
RBC #/AREA URNS AUTO: ABNORMAL /HPF
SODIUM SERPL-SCNC: 140 MMOL/L (ref 133–145)
SP GR UR STRIP.AUTO: 1.01 (ref 1–1.03)
UROBILINOGEN UR QL STRIP.AUTO: 0.2 E.U./DL
WBC # BLD AUTO: 7.29 THOUSAND/UL (ref 4.31–10.16)
WBC #/AREA URNS AUTO: ABNORMAL /HPF

## 2022-01-08 PROCEDURE — 85025 COMPLETE CBC W/AUTO DIFF WBC: CPT

## 2022-01-08 PROCEDURE — 80053 COMPREHEN METABOLIC PANEL: CPT

## 2022-01-08 PROCEDURE — 81001 URINALYSIS AUTO W/SCOPE: CPT

## 2022-01-08 PROCEDURE — 36415 COLL VENOUS BLD VENIPUNCTURE: CPT

## 2022-04-26 ENCOUNTER — LAB (OUTPATIENT)
Dept: LAB | Facility: HOSPITAL | Age: 67
End: 2022-04-26
Payer: COMMERCIAL

## 2022-04-26 DIAGNOSIS — D68.59 PRIMARY HYPERCOAGULABLE STATE (HCC): ICD-10-CM

## 2022-04-26 DIAGNOSIS — E78.5 HYPERLIPIDEMIA, UNSPECIFIED HYPERLIPIDEMIA TYPE: ICD-10-CM

## 2022-04-26 LAB
ALBUMIN SERPL BCP-MCNC: 4.2 G/DL (ref 3.5–5)
ALP SERPL-CCNC: 58 U/L (ref 34–104)
ALT SERPL W P-5'-P-CCNC: 18 U/L (ref 7–52)
ANION GAP SERPL CALCULATED.3IONS-SCNC: 6 MMOL/L (ref 4–13)
AST SERPL W P-5'-P-CCNC: 20 U/L (ref 13–39)
BASOPHILS # BLD AUTO: 0.07 THOUSANDS/ΜL (ref 0–0.1)
BASOPHILS NFR BLD AUTO: 1 % (ref 0–1)
BILIRUB SERPL-MCNC: 0.66 MG/DL (ref 0.2–1)
BUN SERPL-MCNC: 16 MG/DL (ref 5–25)
CALCIUM SERPL-MCNC: 9.4 MG/DL (ref 8.4–10.2)
CHLORIDE SERPL-SCNC: 103 MMOL/L (ref 96–108)
CHOLEST SERPL-MCNC: 227 MG/DL
CO2 SERPL-SCNC: 32 MMOL/L (ref 21–32)
CREAT SERPL-MCNC: 0.64 MG/DL (ref 0.6–1.3)
EOSINOPHIL # BLD AUTO: 0.93 THOUSAND/ΜL (ref 0–0.61)
EOSINOPHIL NFR BLD AUTO: 12 % (ref 0–6)
ERYTHROCYTE [DISTWIDTH] IN BLOOD BY AUTOMATED COUNT: 12.7 % (ref 11.6–15.1)
GFR SERPL CREATININE-BSD FRML MDRD: 92 ML/MIN/1.73SQ M
GLUCOSE P FAST SERPL-MCNC: 95 MG/DL (ref 65–99)
HCT VFR BLD AUTO: 44.4 % (ref 34.8–46.1)
HDLC SERPL-MCNC: 56 MG/DL
HGB BLD-MCNC: 14.4 G/DL (ref 11.5–15.4)
IMM GRANULOCYTES # BLD AUTO: 0.02 THOUSAND/UL (ref 0–0.2)
IMM GRANULOCYTES NFR BLD AUTO: 0 % (ref 0–2)
LDLC SERPL CALC-MCNC: 149 MG/DL (ref 0–100)
LYMPHOCYTES # BLD AUTO: 1.94 THOUSANDS/ΜL (ref 0.6–4.47)
LYMPHOCYTES NFR BLD AUTO: 24 % (ref 14–44)
MCH RBC QN AUTO: 30.4 PG (ref 26.8–34.3)
MCHC RBC AUTO-ENTMCNC: 32.4 G/DL (ref 31.4–37.4)
MCV RBC AUTO: 94 FL (ref 82–98)
MONOCYTES # BLD AUTO: 0.7 THOUSAND/ΜL (ref 0.17–1.22)
MONOCYTES NFR BLD AUTO: 9 % (ref 4–12)
NEUTROPHILS # BLD AUTO: 4.3 THOUSANDS/ΜL (ref 1.85–7.62)
NEUTS SEG NFR BLD AUTO: 54 % (ref 43–75)
NONHDLC SERPL-MCNC: 171 MG/DL
NRBC BLD AUTO-RTO: 0 /100 WBCS
PLATELET # BLD AUTO: 385 THOUSANDS/UL (ref 149–390)
PMV BLD AUTO: 10.2 FL (ref 8.9–12.7)
POTASSIUM SERPL-SCNC: 3.9 MMOL/L (ref 3.5–5.3)
PROT SERPL-MCNC: 7.3 G/DL (ref 6.4–8.4)
RBC # BLD AUTO: 4.73 MILLION/UL (ref 3.81–5.12)
SODIUM SERPL-SCNC: 141 MMOL/L (ref 135–147)
TRIGL SERPL-MCNC: 108 MG/DL
WBC # BLD AUTO: 7.96 THOUSAND/UL (ref 4.31–10.16)

## 2022-04-26 PROCEDURE — 80053 COMPREHEN METABOLIC PANEL: CPT

## 2022-04-26 PROCEDURE — 36415 COLL VENOUS BLD VENIPUNCTURE: CPT

## 2022-04-26 PROCEDURE — 85025 COMPLETE CBC W/AUTO DIFF WBC: CPT

## 2022-04-26 PROCEDURE — 80061 LIPID PANEL: CPT

## 2022-04-27 ENCOUNTER — HOSPITAL ENCOUNTER (EMERGENCY)
Facility: HOSPITAL | Age: 67
Discharge: HOME | End: 2022-04-27
Attending: INTERNAL MEDICINE | Admitting: INTERNAL MEDICINE
Payer: COMMERCIAL

## 2022-04-27 ENCOUNTER — TELEPHONE (OUTPATIENT)
Dept: OTHER | Facility: OTHER | Age: 67
End: 2022-04-27

## 2022-04-27 VITALS
HEIGHT: 60 IN | WEIGHT: 150 LBS | TEMPERATURE: 99 F | OXYGEN SATURATION: 98 % | BODY MASS INDEX: 29.45 KG/M2 | RESPIRATION RATE: 20 BRPM | SYSTOLIC BLOOD PRESSURE: 137 MMHG | DIASTOLIC BLOOD PRESSURE: 119 MMHG | HEART RATE: 73 BPM

## 2022-04-27 DIAGNOSIS — U07.1 COVID-19: Primary | ICD-10-CM

## 2022-04-27 DIAGNOSIS — N39.0 UTI (URINARY TRACT INFECTION): ICD-10-CM

## 2022-04-27 LAB
BACTERIA UR QL AUTO: ABNORMAL /HPF
BILIRUB UR QL STRIP: NEGATIVE
CLARITY UR: CLEAR
COLOR UR: YELLOW
FLUAV RNA RESP QL NAA+PROBE: NEGATIVE
FLUBV RNA RESP QL NAA+PROBE: NEGATIVE
GLUCOSE UR STRIP-MCNC: NEGATIVE MG/DL
HGB UR QL STRIP.AUTO: ABNORMAL
KETONES UR STRIP-MCNC: NEGATIVE MG/DL
LEUKOCYTE ESTERASE UR QL STRIP: ABNORMAL
MUCOUS THREADS UR QL AUTO: ABNORMAL
NITRITE UR QL STRIP: NEGATIVE
NON-SQ EPI CELLS URNS QL MICRO: ABNORMAL /HPF
PH UR STRIP.AUTO: 5.5 [PH]
PROT UR STRIP-MCNC: NEGATIVE MG/DL
RBC #/AREA URNS AUTO: ABNORMAL /HPF
RSV RNA RESP QL NAA+PROBE: NEGATIVE
SARS-COV-2 RNA RESP QL NAA+PROBE: POSITIVE
SP GR UR STRIP.AUTO: >=1.03 (ref 1–1.03)
UROBILINOGEN UR QL STRIP.AUTO: 0.2 E.U./DL
WBC #/AREA URNS AUTO: ABNORMAL /HPF

## 2022-04-27 PROCEDURE — 0241U HB NFCT DS VIR RESP RNA 4 TRGT: CPT | Performed by: PHYSICIAN ASSISTANT

## 2022-04-27 PROCEDURE — 99285 EMERGENCY DEPT VISIT HI MDM: CPT | Performed by: PHYSICIAN ASSISTANT

## 2022-04-27 PROCEDURE — 99283 EMERGENCY DEPT VISIT LOW MDM: CPT

## 2022-04-27 PROCEDURE — 81001 URINALYSIS AUTO W/SCOPE: CPT | Performed by: PHYSICIAN ASSISTANT

## 2022-04-27 RX ORDER — SULFAMETHOXAZOLE AND TRIMETHOPRIM 800; 160 MG/1; MG/1
1 TABLET ORAL 2 TIMES DAILY
Qty: 6 TABLET | Refills: 0 | Status: SHIPPED | OUTPATIENT
Start: 2022-04-27 | End: 2022-04-30

## 2022-04-27 RX ORDER — PRAVASTATIN SODIUM 10 MG
10 TABLET ORAL DAILY
COMMUNITY

## 2022-04-27 RX ORDER — SULFAMETHOXAZOLE AND TRIMETHOPRIM 800; 160 MG/1; MG/1
1 TABLET ORAL 2 TIMES DAILY
Qty: 6 TABLET | Refills: 0 | Status: SHIPPED | OUTPATIENT
Start: 2022-04-27 | End: 2022-04-27 | Stop reason: SDUPTHER

## 2022-04-27 RX ORDER — IBUPROFEN 600 MG/1
600 TABLET ORAL ONCE
Status: COMPLETED | OUTPATIENT
Start: 2022-04-27 | End: 2022-04-27

## 2022-04-27 RX ADMIN — IBUPROFEN 600 MG: 600 TABLET ORAL at 13:07

## 2022-04-27 NOTE — DISCHARGE INSTRUCTIONS
Your COVID test is positive  Please stay quarantined/isolation in your home  Do not interact with your family or other people in the community  You must stay isolation/quarantine for a minimum of 5 days after symptoms have begun, which was today, so until 5/3;  and be symptom-free (without fever) for 24 hours before you go out of quarantine/isolation  Then follow strict mask wearing for an additional 5 days so until 5/8    The people you live with or people should follow CDC recommendations regarding protocols  [employer]ified uy    Take Vitamin D3 2000 units daily 23years old and older only  Please self-prone (sleep on your stomach if possible)      Call your family doctor when you receive these results    Return to the ED with any worsening symptoms

## 2022-04-27 NOTE — ED PROVIDER NOTES
History  Chief Complaint   Patient presents with    Fatigue     pt presents to ed via walk in with feeling weak that started last night when she started her new medications also states she has nausea      Pt with Past Medical History: Chronic L shoulder pain, Hyperlipidemia   Past Surgical History: HYSTERECTOMY, SMALL INTESTINE SURGERY, VARICOSE VEIN SURGERY      Presents to ED c/o 10 hour h/o constant, generalized weakness, described as "muscles being squeezed", associated with + nausea, no vomiting, + dizziness, + fatigue, no fever, no cp, no sob, no NVD, no urinary complaints, no URI sx  Pt is concerned sx might be a reaction to Pravachol medication that she just started yesterday, only took 1 pill  Pt was taking a antihyperlipidemia medication, but was having side effects so switched to pravachol yesterday and sx began this am   Pt works at [employer], didn't want to go to work today bc sx  Pt drove herself to ED  Prior to Admission Medications   Prescriptions Last Dose Informant Patient Reported? Taking?   pravastatin (PRAVACHOL) 10 mg tablet  Self Yes Yes   Sig: Take 10 mg by mouth daily      Facility-Administered Medications: None       Past Medical History:   Diagnosis Date    Chronic pain     L shoulder    Hyperlipidemia        Past Surgical History:   Procedure Laterality Date    HYSTERECTOMY      SMALL INTESTINE SURGERY      VARICOSE VEIN SURGERY         Family History   Problem Relation Age of Onset    Lung cancer Mother     No Known Problems Father      I have reviewed and agree with the history as documented  E-Cigarette/Vaping    E-Cigarette Use Never User      E-Cigarette/Vaping Substances     Social History     Tobacco Use    Smoking status: Never Smoker    Smokeless tobacco: Never Used   Vaping Use    Vaping Use: Never used   Substance Use Topics    Alcohol use: Never    Drug use: Never       Review of Systems   Constitutional: Negative for chills and fever     HENT: Negative for hearing loss, sore throat and trouble swallowing  Respiratory: Negative for cough and shortness of breath  Cardiovascular: Negative for chest pain and leg swelling  Gastrointestinal: Positive for nausea  Negative for abdominal pain, diarrhea and vomiting  Genitourinary: Negative for dysuria and frequency  Musculoskeletal: Positive for myalgias  Negative for arthralgias and gait problem  Skin: Negative for color change and pallor  Neurological: Positive for dizziness and weakness  Psychiatric/Behavioral: Negative for behavioral problems  All other systems reviewed and are negative  Physical Exam  Physical Exam  Vitals and nursing note reviewed  Constitutional:       General: She is not in acute distress  Appearance: Normal appearance  She is well-developed  HENT:      Head: Normocephalic and atraumatic  Right Ear: External ear normal       Left Ear: External ear normal       Nose: Nose normal  No congestion or rhinorrhea  Mouth/Throat:      Mouth: Mucous membranes are moist       Pharynx: Oropharynx is clear  Eyes:      Conjunctiva/sclera: Conjunctivae normal       Pupils: Pupils are equal, round, and reactive to light  Cardiovascular:      Rate and Rhythm: Normal rate and regular rhythm  Heart sounds: No murmur heard  Pulmonary:      Effort: Pulmonary effort is normal  No respiratory distress  Breath sounds: Normal breath sounds  Abdominal:      Palpations: Abdomen is soft  Tenderness: There is no abdominal tenderness  Musculoskeletal:         General: Normal range of motion  Cervical back: Neck supple  No tenderness  Right lower leg: No edema  Left lower leg: No edema  Skin:     General: Skin is warm and dry  Capillary Refill: Capillary refill takes less than 2 seconds  Findings: No erythema or rash  Neurological:      General: No focal deficit present        Mental Status: She is alert and oriented to person, place, and time  Mental status is at baseline  Cranial Nerves: No cranial nerve deficit  Motor: No weakness  Gait: Gait (ambulates in ED without difficulty) normal          Vital Signs  ED Triage Vitals [04/27/22 1154]   Temperature Pulse Respirations Blood Pressure SpO2   99 °F (37 2 °C) 79 20 107/58 100 %      Temp Source Heart Rate Source Patient Position - Orthostatic VS BP Location FiO2 (%)   Oral Monitor Lying Left arm --      Pain Score       10 - Worst Possible Pain           Vitals:    04/27/22 1154   BP: 107/58   Pulse: 79   Patient Position - Orthostatic VS: Lying         Visual Acuity      ED Medications  Medications   ibuprofen (MOTRIN) tablet 600 mg (600 mg Oral Given 4/27/22 1307)       Diagnostic Studies  Results Reviewed     Procedure Component Value Units Date/Time    COVID/FLU/RSV - 2 hour TAT [[id number]]  (Abnormal) Collected: 04/27/22 1237    Lab Status: Final result Specimen: Nares from Nose Updated: 04/27/22 1324     SARS-CoV-2 Positive     INFLUENZA A PCR Negative     INFLUENZA B PCR Negative     RSV PCR Negative    Narrative:      FOR PEDIATRIC PATIENTS - copy/paste COVID Guidelines URL to browser: [email or web address]  [email or web address]    SARS-CoV-2 assay is a Nucleic Acid Amplification assay intended for the  qualitative detection of nucleic acid from SARS-CoV-2 in nasopharyngeal  swabs  Results are for the presumptive identification of SARS-CoV-2 RNA  Positive results are indicative of infection with SARS-CoV-2, the virus  causing COVID-19, but do not rule out bacterial infection or co-infection  with other viruses  Laboratories within the [place] and its  territories are required to report all positive results to the appropriate  public health authorities  Negative results do not preclude SARS-CoV-2  infection and should not be used as the sole basis for treatment or other  patient management decisions   Negative results must be combined with  clinical observations, patient history, and epidemiological information  This test has not been FDA cleared or approved  This test has been authorized by FDA under an Emergency Use Authorization  (EUA)  This test is only authorized for the duration of time the  declaration that circumstances exist justifying the authorization of the  emergency use of an in vitro diagnostic tests for detection of SARS-CoV-2  virus and/or diagnosis of COVID-19 infection under section 564(b)(1) of  the Act, 21 U  S C  799VNC-6(K)(4), unless the authorization is terminated  or revoked sooner  The test has been validated but independent review by FDA  and CLIA is pending  Test performed using [email or web address] GeneXpert: This RT-PCR assay targets N2,  a region unique to SARS-CoV-2  A conserved region in the E-gene was chosen  for pan-Sarbecovirus detection which includes SARS-CoV-2      Urine Microscopic [[id number]]  (Abnormal) Collected: 04/27/22 1236    Lab Status: Final result Specimen: Urine, Clean Catch Updated: 04/27/22 1307     RBC, UA 0-5 /hpf      WBC, UA 4-10 /hpf      Epithelial Cells Moderate /hpf      Bacteria, UA Moderate /hpf      MUCUS THREADS Occasional    UA w Reflex to Microscopic w Reflex to Culture [[id number]]  (Abnormal) Collected: 04/27/22 1236    Lab Status: Final result Specimen: Urine, Clean Catch Updated: 04/27/22 1250     Color, UA Yellow     Clarity, UA Clear     Specific Gravity, UA >=1 030     pH, UA 5 5     Leukocytes, UA 1+     Nitrite, UA Negative     Protein, UA Negative mg/dl      Glucose, UA Negative mg/dl      Ketones, UA Negative mg/dl      Urobilinogen, UA 0 2 E U /dl      Bilirubin, UA Negative     Blood, UA Trace-lysed                 No orders to display              Procedures  ECG 12 Lead Documentation Only    Date/Time: 4/27/2022 12:02 PM  Performed by: Korina Richard PA-C  Authorized by: Korina Richard PA-C     Indications / Diagnosis:  Weakness  ECG reviewed by me, the ED Provider: yes    Patient location:  ED  Previous ECG:     Comparison to cardiac monitor: Yes    Interpretation:     Interpretation: normal    Rate:     ECG rate:  68    ECG rate assessment: normal    Rhythm:     Rhythm: sinus rhythm    Comments:      No acute ischemic changes             ED Course                               SBIRT 20yo+      Most Recent Value   SBIRT (22 yo +)    In order to provide better care to our patients, we are screening all of our patients for alcohol and drug use  Would it be okay to ask you these screening questions? No Filed at: 04/27/2022 1308                    MDM  Number of Diagnoses or Management Options  COVID-19  UTI (urinary tract infection)  Diagnosis management comments: Cbc, cmp done yesterday, wnl  Pt + COVID and simple UTI, will tx with antibx for 3 days       Amount and/or Complexity of Data Reviewed  Clinical lab tests: reviewed  Review and summarize past medical records: yes        Disposition  Final diagnoses:   COVID-19   UTI (urinary tract infection)     Time reflects when diagnosis was documented in both MDM as applicable and the Disposition within this note     Time User Action Codes Description Comment    4/27/2022  1:40 PM Gearline Angles Add [U07 1] COVID-19     4/27/2022  1:40 PM Gearline Angles Add [N39 0] UTI (urinary tract infection)       ED Disposition     ED Disposition Condition Date/Time Comment    Discharge Stable Wed Apr 27, 2022  1:40 PM Angel Logan discharge to home/self care              Follow-up Information     Follow up With Specialties Details Why Contact Irina Godoy, [address], Nurse Practitioner  As needed [address] [place] [address]  [phone]            Patient's Medications   Discharge Prescriptions    SULFAMETHOXAZOLE-TRIMETHOPRIM (BACTRIM DS) 800-160 MG PER TABLET    Take 1 tablet by mouth 2 (two) times a day for 3 days smx-tmp DS (BACTRIM) 800-160 mg tabs (1tab q12 D10)       Start Date: 4/27/2022 End Date: 4/30/2022       Order Dose: 1 tablet       Quantity: 6 tablet    Refills: 0       No discharge procedures on file      PDMP Review     None          ED Provider  Electronically Signed by           Sujatha Blas PA-C  04/27/22 6699

## 2022-04-27 NOTE — Clinical Note
Fredy Husbands was seen and treated in our emergency department on 4/27/2022  Other - See Comments         Diagnosis:      Brenda     She may return on this date: Your COVID test is positive  Please stay quarantined/isolation in your home  Do not interact with your family or other people in the community  You must stay isolation/quarantine for a minimum of 5 days after symptoms have begun, which was today, so until 5/3;  and be symptom-free (without fever) for 24 hours before you go out of quarantine/isolation  Then follow strict mask wearing for an additional 5 days so until 5/8       If you have any questions or concerns, please don't hesitate to call        Wade Toledo PA-C    ______________________________           _______________          _______________  Hospital Representative                              Date                                Time

## 2022-04-27 NOTE — TELEPHONE ENCOUNTER
Patient of Luis Carlos Benz calling stating she has an appointment tomorrow that she must cancel, stated that she has covid  Please follow up to reschedule

## 2022-04-28 ENCOUNTER — TELEPHONE (OUTPATIENT)
Dept: FAMILY MEDICINE CLINIC | Facility: CLINIC | Age: 67
End: 2022-04-28

## 2022-04-28 NOTE — TELEPHONE ENCOUNTER
----- Message from Johnny Acuna PA-C sent at 4/27/2022  1:37 PM EDT -----  Regarding: COVID + , MAB candidate  Pt COVID + sx started today, please FU for antibodies

## 2022-05-03 ENCOUNTER — HOSPITAL ENCOUNTER (EMERGENCY)
Facility: HOSPITAL | Age: 67
Discharge: HOME | End: 2022-05-03
Attending: EMERGENCY MEDICINE | Admitting: EMERGENCY MEDICINE
Payer: COMMERCIAL

## 2022-05-03 VITALS
HEART RATE: 68 BPM | TEMPERATURE: 98.1 F | BODY MASS INDEX: 29.45 KG/M2 | RESPIRATION RATE: 17 BRPM | OXYGEN SATURATION: 100 % | SYSTOLIC BLOOD PRESSURE: 116 MMHG | DIASTOLIC BLOOD PRESSURE: 63 MMHG | WEIGHT: 150 LBS | HEIGHT: 60 IN

## 2022-05-03 DIAGNOSIS — U07.1 COVID-19: Primary | ICD-10-CM

## 2022-05-03 LAB
ALBUMIN SERPL BCP-MCNC: 3.9 G/DL (ref 3.5–5)
ALP SERPL-CCNC: 52 U/L (ref 34–104)
ALT SERPL W P-5'-P-CCNC: 57 U/L (ref 7–52)
ANION GAP SERPL CALCULATED.3IONS-SCNC: 7 MMOL/L (ref 4–13)
AST SERPL W P-5'-P-CCNC: 35 U/L (ref 13–39)
BASOPHILS # BLD AUTO: 0.02 THOUSANDS/ΜL (ref 0–0.1)
BASOPHILS NFR BLD AUTO: 0 % (ref 0–1)
BILIRUB SERPL-MCNC: 0.51 MG/DL (ref 0.2–1)
BUN SERPL-MCNC: 14 MG/DL (ref 5–25)
CALCIUM SERPL-MCNC: 9.1 MG/DL (ref 8.4–10.2)
CHLORIDE SERPL-SCNC: 104 MMOL/L (ref 96–108)
CO2 SERPL-SCNC: 28 MMOL/L (ref 21–32)
CREAT SERPL-MCNC: 0.53 MG/DL (ref 0.6–1.3)
EOSINOPHIL # BLD AUTO: 0.21 THOUSAND/ΜL (ref 0–0.61)
EOSINOPHIL NFR BLD AUTO: 4 % (ref 0–6)
ERYTHROCYTE [DISTWIDTH] IN BLOOD BY AUTOMATED COUNT: 12.4 % (ref 11.6–15.1)
GFR SERPL CREATININE-BSD FRML MDRD: 98 ML/MIN/1.73SQ M
GLUCOSE SERPL-MCNC: 96 MG/DL (ref 65–140)
HCT VFR BLD AUTO: 42.6 % (ref 34.8–46.1)
HGB BLD-MCNC: 14.6 G/DL (ref 11.5–15.4)
IMM GRANULOCYTES # BLD AUTO: 0.01 THOUSAND/UL (ref 0–0.2)
IMM GRANULOCYTES NFR BLD AUTO: 0 % (ref 0–2)
LYMPHOCYTES # BLD AUTO: 2.26 THOUSANDS/ΜL (ref 0.6–4.47)
LYMPHOCYTES NFR BLD AUTO: 48 % (ref 14–44)
MCH RBC QN AUTO: 30.7 PG (ref 26.8–34.3)
MCHC RBC AUTO-ENTMCNC: 34.3 G/DL (ref 31.4–37.4)
MCV RBC AUTO: 90 FL (ref 82–98)
MONOCYTES # BLD AUTO: 0.55 THOUSAND/ΜL (ref 0.17–1.22)
MONOCYTES NFR BLD AUTO: 12 % (ref 4–12)
NEUTROPHILS # BLD AUTO: 1.75 THOUSANDS/ΜL (ref 1.85–7.62)
NEUTS SEG NFR BLD AUTO: 36 % (ref 43–75)
NRBC BLD AUTO-RTO: 0 /100 WBCS
PLATELET # BLD AUTO: 332 THOUSANDS/UL (ref 149–390)
PMV BLD AUTO: 10.3 FL (ref 8.9–12.7)
POTASSIUM SERPL-SCNC: 3.7 MMOL/L (ref 3.5–5.3)
PROT SERPL-MCNC: 6.8 G/DL (ref 6.4–8.4)
RBC # BLD AUTO: 4.76 MILLION/UL (ref 3.81–5.12)
SODIUM SERPL-SCNC: 139 MMOL/L (ref 135–147)
WBC # BLD AUTO: 4.8 THOUSAND/UL (ref 4.31–10.16)

## 2022-05-03 PROCEDURE — 99284 EMERGENCY DEPT VISIT MOD MDM: CPT | Performed by: EMERGENCY MEDICINE

## 2022-05-03 PROCEDURE — 96361 HYDRATE IV INFUSION ADD-ON: CPT

## 2022-05-03 PROCEDURE — 36415 COLL VENOUS BLD VENIPUNCTURE: CPT | Performed by: EMERGENCY MEDICINE

## 2022-05-03 PROCEDURE — 99284 EMERGENCY DEPT VISIT MOD MDM: CPT

## 2022-05-03 PROCEDURE — 80053 COMPREHEN METABOLIC PANEL: CPT | Performed by: EMERGENCY MEDICINE

## 2022-05-03 PROCEDURE — 93005 ELECTROCARDIOGRAM TRACING: CPT

## 2022-05-03 PROCEDURE — 96374 THER/PROPH/DIAG INJ IV PUSH: CPT

## 2022-05-03 PROCEDURE — 85025 COMPLETE CBC W/AUTO DIFF WBC: CPT | Performed by: EMERGENCY MEDICINE

## 2022-05-03 RX ORDER — KETOROLAC TROMETHAMINE 30 MG/ML
15 INJECTION, SOLUTION INTRAMUSCULAR; INTRAVENOUS ONCE
Status: COMPLETED | OUTPATIENT
Start: 2022-05-03 | End: 2022-05-03

## 2022-05-03 RX ADMIN — KETOROLAC TROMETHAMINE 15 MG: 30 INJECTION, SOLUTION INTRAMUSCULAR at 11:32

## 2022-05-03 RX ADMIN — SODIUM CHLORIDE 1000 ML: 0.9 INJECTION, SOLUTION INTRAVENOUS at 10:50

## 2022-05-03 NOTE — ED PROVIDER NOTES
History  Chief Complaint   Patient presents with    Headache     Patient dx with covid last week and c/o fatigue, headache and dizziness  Took OTC Tylenol with little relief  50-year-old female presenting with dizziness, muscle aches, shortness of breath and weakness  She was recently diagnosed with COVID  Patient has been taking Tylenol with minimal to no relief  She is vaccinated  Patient is tearful stating that she just wants to feel better          Prior to Admission Medications   Prescriptions Last Dose Informant Patient Reported? Taking?   pravastatin (PRAVACHOL) 10 mg tablet  Self Yes No   Sig: Take 10 mg by mouth daily      Facility-Administered Medications: None       Past Medical History:   Diagnosis Date    Chronic pain     L shoulder    Hyperlipidemia        Past Surgical History:   Procedure Laterality Date    HYSTERECTOMY      SMALL INTESTINE SURGERY      VARICOSE VEIN SURGERY         Family History   Problem Relation Age of Onset    Lung cancer Mother     No Known Problems Father      I have reviewed and agree with the history as documented  E-Cigarette/Vaping    E-Cigarette Use Never User      E-Cigarette/Vaping Substances     Social History     Tobacco Use    Smoking status: Never Smoker    Smokeless tobacco: Never Used   Vaping Use    Vaping Use: Never used   Substance Use Topics    Alcohol use: Never    Drug use: Never       Review of Systems   Constitutional: Positive for fever  HENT: Negative for congestion  Eyes: Negative for visual disturbance  Respiratory: Positive for shortness of breath  Cardiovascular: Negative for chest pain  Gastrointestinal: Negative for abdominal pain  Musculoskeletal: Negative for neck pain  Skin: Negative for rash  Neurological: Positive for dizziness, weakness and headaches  Psychiatric/Behavioral: The patient is not nervous/anxious  Physical Exam  Physical Exam  Vitals and nursing note reviewed     Constitutional: General: She is not in acute distress  HENT:      Head: Normocephalic and atraumatic  Mouth/Throat:      Mouth: Mucous membranes are dry  Pharynx: No posterior oropharyngeal erythema  Eyes:      Conjunctiva/sclera: Conjunctivae normal    Cardiovascular:      Rate and Rhythm: Regular rhythm  Pulmonary:      Effort: Pulmonary effort is normal  No respiratory distress  Abdominal:      General: There is no distension  Musculoskeletal:      Cervical back: No rigidity  Skin:     General: Skin is warm and dry  Neurological:      Mental Status: She is alert  Mental status is at baseline  Comments: Normal finger-nose bilaterally   Psychiatric:         Mood and Affect: Mood is anxious  Affect is tearful           Vital Signs  ED Triage Vitals [05/03/22 1018]   Temperature Pulse Respirations Blood Pressure SpO2   98 1 °F (36 7 °C) 68 17 116/63 100 %      Temp Source Heart Rate Source Patient Position - Orthostatic VS BP Location FiO2 (%)   Oral Monitor Sitting Right arm --      Pain Score       5           Vitals:    05/03/22 1018   BP: 116/63   Pulse: 68   Patient Position - Orthostatic VS: Sitting         Visual Acuity      ED Medications  Medications   sodium chloride 0 9 % bolus 1,000 mL (1,000 mL Intravenous New Bag 5/3/22 1050)   ketorolac (TORADOL) injection 15 mg (15 mg Intravenous Given 5/3/22 1132)       Diagnostic Studies  Results Reviewed     Procedure Component Value Units Date/Time    Comprehensive metabolic panel [[id number]]  (Abnormal) Collected: 05/03/22 1051    Lab Status: Final result Specimen: Blood from Arm, Right Updated: 05/03/22 1112     Sodium 139 mmol/L      Potassium 3 7 mmol/L      Chloride 104 mmol/L      CO2 28 mmol/L      ANION GAP 7 mmol/L      BUN 14 mg/dL      Creatinine 0 53 mg/dL      Glucose 96 mg/dL      Calcium 9 1 mg/dL      AST 35 U/L      ALT 57 U/L      Alkaline Phosphatase 52 U/L      Total Protein 6 8 g/dL      Albumin 3 9 g/dL      Total Bilirubin 0 51 mg/dL      eGFR 98 ml/min/1 73sq m     Narrative:      National Kid[employer] Disease Foundation guidelines for Chronic Kidney Disease (CKD):     Stage 1 with normal or high GFR (GFR > 90 mL/min/1 73 square meters)    Stage 2 Mild CKD (GFR = 60-89 mL/min/1 73 square meters)    Stage 3A Moderate CKD (GFR = 45-59 mL/min/1 73 square meters)    Stage 3B Moderate CKD (GFR = 30-44 mL/min/1 73 square meters)    Stage 4 Severe CKD (GFR = 15-29 mL/min/1 73 square meters)    Stage 5 End Stage CKD (GFR <15 mL/min/1 73 square meters)  Note: GFR calculation is accurate only with a steady state creatinine    CBC and differential [[id number]]  (Abnormal) Collected: 05/03/22 1051    Lab Status: Final result Specimen: Blood from Arm, Right Updated: 05/03/22 1057     WBC 4 80 Thousand/uL      RBC 4 76 Million/uL      Hemoglobin 14 6 g/dL      Hematocrit 42 6 %      MCV 90 fL      MCH 30 7 pg      MCHC 34 3 g/dL      RDW 12 4 %      MPV 10 3 fL      Platelets 419 Thousands/uL      nRBC 0 /100 WBCs      Neutrophils Relative 36 %      Immat GRANS % 0 %      Lymphocytes Relative 48 %      Monocytes Relative 12 %      Eosinophils Relative 4 %      Basophils Relative 0 %      Neutrophils Absolute 1 75 Thousands/µL      Immature Grans Absolute 0 01 Thousand/uL      Lymphocytes Absolute 2 26 Thousands/µL      Monocytes Absolute 0 55 Thousand/µL      Eosinophils Absolute 0 21 Thousand/µL      Basophils Absolute 0 02 Thousands/µL                  No orders to display              Procedures  ECG 12 Lead Documentation Only    Date/Time: 5/3/2022 12:17 PM  Performed by: Nelia Cunningham DO  Authorized by: Nelia Cunningham DO     Indications / Diagnosis:  Dizzy  ECG reviewed by me, the ED Provider: yes    Interpretation:     Interpretation: normal    Rate:     ECG rate:  48    ECG rate assessment: bradycardic    Rhythm:     Rhythm: sinus bradycardia    Ectopy:     Ectopy: none    QRS:     QRS axis:  Normal  Conduction:     Conduction: normal ST segments:     ST segments:  Normal  T waves:     T waves: normal               ED Course  ED Course as of 05/03/22 1255   Tue May 03, 2022   1120 Blood work without clinical significance  On reassessment, patient states she is feeling much better  She is no longer dizzy  SBIRT 20yo+      Most Recent Value   SBIRT (22 yo +)    In order to provide better care to our patients, we are screening all of our patients for alcohol and drug use  Would it be okay to ask you these screening questions? Yes Filed at: 05/03/2022 1020   Initial Alcohol Screen: US AUDIT-C     1  How often do you have a drink containing alcohol? 0 Filed at: 05/03/2022 1020   2  How many drinks containing alcohol do you have on a typical day you are drinking? 0 Filed at: 05/03/2022 1020   3a  Male UNDER 65: How often do you have five or more drinks on one occasion? 0 Filed at: 05/03/2022 1020   3b  FEMALE Any Age, or MALE 65+: How often do you have 4 or more drinks on one occassion? 0 Filed at: 05/03/2022 1020   Audit-C Score 0 Filed at: 05/03/2022 1020   KENNY: How many times in the past year have you    Used an illegal drug or used a prescription medication for non-medical reasons? Never Filed at: 05/03/2022 1020                    MDM  Number of Diagnoses or Management Options  Diagnosis management comments: 70-year-old female presenting with dizziness, weakness, and  fatigue  She was recently diagnosed with COVID  Patient is tearful stating that she just wants to be better  We discussed that this is somewhat expected with COVID  On exam, she is tearful and anxious  Her lungs are clear  She is neither hypoxic nor febrile  She has no cerebellar signs  Will treat symptomatically with fluids  Will obtain screening EKG    Will give dose of Toradol      Disposition  Final diagnoses:   [id number]     Time reflects when diagnosis was documented in both MDM as applicable and the Disposition within this note Time User Action Codes Description Comment    5/3/2022 10:42 AM Tonja Almanza Add [U07 1] COVID-19       ED Disposition     ED Disposition Condition Date/Time Comment    Discharge Stable Tue May 3, 2022 12:55 PM Stefania Beverly discharge to home/self care  Follow-up Information     Follow up With Specialties Details Why Contact Info    Rayshawn Dela Cruz, [address], Nurse Practitioner Schedule an appointment as soon as possible for a visit   [address] [facility] [address]  [phone]            Patient's Medications   Discharge Prescriptions    No medications on file       No discharge procedures on file      PDMP Review     None          ED Provider  Electronically Signed by           Rosalia Morris DO  05/03/22 0921

## 2022-05-04 LAB
ATRIAL RATE: 47 BPM
P AXIS: 60 DEGREES
PR INTERVAL: 166 MS
QRS AXIS: 20 DEGREES
QRSD INTERVAL: 94 MS
QT INTERVAL: 444 MS
QTC INTERVAL: 397 MS
T WAVE AXIS: 52 DEGREES
VENTRICULAR RATE: 48 BPM

## 2022-05-04 PROCEDURE — 93010 ELECTROCARDIOGRAM REPORT: CPT | Performed by: INTERNAL MEDICINE

## 2022-07-21 ENCOUNTER — APPOINTMENT (OUTPATIENT)
Dept: LAB | Facility: HOSPITAL | Age: 67
End: 2022-07-21
Payer: COMMERCIAL

## 2022-07-21 DIAGNOSIS — R14.0 ABDOMINAL DISTENTION: ICD-10-CM

## 2022-07-21 LAB
ALBUMIN SERPL BCP-MCNC: 4.2 G/DL (ref 3.5–5)
ALP SERPL-CCNC: 68 U/L (ref 34–104)
ALT SERPL W P-5'-P-CCNC: 22 U/L (ref 7–52)
AMYLASE SERPL-CCNC: 34 IU/L (ref 29–103)
ANION GAP SERPL CALCULATED.3IONS-SCNC: 9 MMOL/L (ref 4–13)
AST SERPL W P-5'-P-CCNC: 19 U/L (ref 13–39)
BASOPHILS # BLD AUTO: 0.06 THOUSANDS/ΜL (ref 0–0.1)
BASOPHILS NFR BLD AUTO: 1 % (ref 0–1)
BILIRUB SERPL-MCNC: 0.84 MG/DL (ref 0.2–1)
BUN SERPL-MCNC: 15 MG/DL (ref 5–25)
CALCIUM SERPL-MCNC: 10 MG/DL (ref 8.4–10.2)
CHLORIDE SERPL-SCNC: 99 MMOL/L (ref 96–108)
CK SERPL-CCNC: 60 U/L (ref 26–192)
CO2 SERPL-SCNC: 29 MMOL/L (ref 21–32)
CREAT SERPL-MCNC: 0.62 MG/DL (ref 0.6–1.3)
EOSINOPHIL # BLD AUTO: 0.58 THOUSAND/ΜL (ref 0–0.61)
EOSINOPHIL NFR BLD AUTO: 6 % (ref 0–6)
ERYTHROCYTE [DISTWIDTH] IN BLOOD BY AUTOMATED COUNT: 12.4 % (ref 11.6–15.1)
GFR SERPL CREATININE-BSD FRML MDRD: 93 ML/MIN/1.73SQ M
GLUCOSE P FAST SERPL-MCNC: 94 MG/DL (ref 65–99)
HCT VFR BLD AUTO: 43.1 % (ref 34.8–46.1)
HGB BLD-MCNC: 14.6 G/DL (ref 11.5–15.4)
IMM GRANULOCYTES # BLD AUTO: 0.02 THOUSAND/UL (ref 0–0.2)
IMM GRANULOCYTES NFR BLD AUTO: 0 % (ref 0–2)
LIPASE SERPL-CCNC: 18 U/L (ref 11–82)
LYMPHOCYTES # BLD AUTO: 2.67 THOUSANDS/ΜL (ref 0.6–4.47)
LYMPHOCYTES NFR BLD AUTO: 28 % (ref 14–44)
MAGNESIUM SERPL-MCNC: 2 MG/DL (ref 1.9–2.7)
MCH RBC QN AUTO: 30.9 PG (ref 26.8–34.3)
MCHC RBC AUTO-ENTMCNC: 33.9 G/DL (ref 31.4–37.4)
MCV RBC AUTO: 91 FL (ref 82–98)
MONOCYTES # BLD AUTO: 1.24 THOUSAND/ΜL (ref 0.17–1.22)
MONOCYTES NFR BLD AUTO: 13 % (ref 4–12)
NEUTROPHILS # BLD AUTO: 5.03 THOUSANDS/ΜL (ref 1.85–7.62)
NEUTS SEG NFR BLD AUTO: 52 % (ref 43–75)
NRBC BLD AUTO-RTO: 0 /100 WBCS
PLATELET # BLD AUTO: 430 THOUSANDS/UL (ref 149–390)
PMV BLD AUTO: 9.8 FL (ref 8.9–12.7)
POTASSIUM SERPL-SCNC: 4.1 MMOL/L (ref 3.5–5.3)
PROT SERPL-MCNC: 7.8 G/DL (ref 6.4–8.4)
RBC # BLD AUTO: 4.72 MILLION/UL (ref 3.81–5.12)
SODIUM SERPL-SCNC: 137 MMOL/L (ref 135–147)
VIT B12 SERPL-MCNC: 429 PG/ML (ref 100–900)
WBC # BLD AUTO: 9.6 THOUSAND/UL (ref 4.31–10.16)

## 2022-07-21 PROCEDURE — 85025 COMPLETE CBC W/AUTO DIFF WBC: CPT

## 2022-07-21 PROCEDURE — 82607 VITAMIN B-12: CPT

## 2022-07-21 PROCEDURE — 80053 COMPREHEN METABOLIC PANEL: CPT

## 2022-07-21 PROCEDURE — 82150 ASSAY OF AMYLASE: CPT

## 2022-07-21 PROCEDURE — 83690 ASSAY OF LIPASE: CPT

## 2022-07-21 PROCEDURE — 36415 COLL VENOUS BLD VENIPUNCTURE: CPT

## 2022-07-21 PROCEDURE — 82550 ASSAY OF CK (CPK): CPT

## 2022-07-21 PROCEDURE — 83735 ASSAY OF MAGNESIUM: CPT

## 2022-07-26 ENCOUNTER — HOSPITAL ENCOUNTER (OUTPATIENT)
Dept: ULTRASOUND IMAGING | Facility: HOSPITAL | Age: 67
Discharge: HOME | End: 2022-07-26
Payer: COMMERCIAL

## 2022-07-26 DIAGNOSIS — R14.0 ABDOMINAL DISTENTION: ICD-10-CM

## 2022-07-26 PROCEDURE — 76700 US EXAM ABDOM COMPLETE: CPT

## 2023-03-03 ENCOUNTER — HOSPITAL ENCOUNTER (OUTPATIENT)
Dept: VASCULAR ULTRASOUND | Facility: HOSPITAL | Age: 68
Discharge: HOME | End: 2023-03-03

## 2023-03-03 DIAGNOSIS — R79.1 ABNORMAL COAGULATION PROFILE: ICD-10-CM

## 2023-03-26 ENCOUNTER — HOSPITAL ENCOUNTER (EMERGENCY)
Facility: HOSPITAL | Age: 68
Discharge: HOME | End: 2023-03-26
Attending: EMERGENCY MEDICINE | Admitting: EMERGENCY MEDICINE

## 2023-03-26 ENCOUNTER — APPOINTMENT (EMERGENCY)
Dept: RADIOLOGY | Facility: HOSPITAL | Age: 68
End: 2023-03-26

## 2023-03-26 VITALS
SYSTOLIC BLOOD PRESSURE: 126 MMHG | OXYGEN SATURATION: 100 % | TEMPERATURE: 97.6 F | BODY MASS INDEX: 29.29 KG/M2 | RESPIRATION RATE: 18 BRPM | DIASTOLIC BLOOD PRESSURE: 78 MMHG | WEIGHT: 150 LBS | HEART RATE: 74 BPM

## 2023-03-26 DIAGNOSIS — M19.90 ARTHRITIS: ICD-10-CM

## 2023-03-26 DIAGNOSIS — M25.561 ACUTE PAIN OF RIGHT KNEE: Primary | ICD-10-CM

## 2023-03-26 RX ORDER — IBUPROFEN 600 MG/1
600 TABLET ORAL EVERY 6 HOURS PRN
Qty: 20 TABLET | Refills: 0 | Status: SHIPPED | OUTPATIENT
Start: 2023-03-26

## 2023-03-26 RX ORDER — OXYCODONE HYDROCHLORIDE AND ACETAMINOPHEN 5; 325 MG/1; MG/1
1 TABLET ORAL ONCE
Status: COMPLETED | OUTPATIENT
Start: 2023-03-26 | End: 2023-03-26

## 2023-03-26 RX ORDER — OXYCODONE HYDROCHLORIDE AND ACETAMINOPHEN 5; 325 MG/1; MG/1
1 TABLET ORAL EVERY 6 HOURS PRN
Qty: 10 TABLET | Refills: 0 | Status: SHIPPED | OUTPATIENT
Start: 2023-03-26 | End: 2023-03-29

## 2023-03-26 RX ADMIN — OXYCODONE HYDROCHLORIDE AND ACETAMINOPHEN 1 TABLET: 5; 325 TABLET ORAL at 17:20

## 2023-03-26 NOTE — DISCHARGE INSTRUCTIONS
Use Ace wrap for next few days for comfort, taking off to bathe or sleep or until follow-up  Use Tylenol 650 mg every 4 hours or Anti-inflammatories like Advil, Motrin, Ibuprofen, Aleve every 6 hours; you can alternate the 2 medications taking something every 3 hours for pain if no improvement add Percocet  Elevate and ice to help with pain  Follow-up with orthopedic doctor in the next few days if no improvement in condition

## 2023-03-26 NOTE — ED PROVIDER NOTES
History  Chief Complaint   Patient presents with   • Knee Pain     Pt c/o left intermittent knee pain x2 weeks, given rx for meds and xray  Didn't get the xray because she was feeling better, c/o worsening x3 days now  Unable to fill rx  Denies injury, unable to bear weight  Past Medical History: Chronic pain - L shoulder, Hyperlipidemia   Past Surgical History: HYSTERECTOMY, SMALL INTESTINE SURGERY, VARICOSE VEIN SURGERY      Pt c/o over 2 week h/o intermittent, but now constant and worsening, atraumatic, diffuse left knee pain  Pt seen by PCP twice for same, sent for outpt xrays but didn't get yet, has been taking NSAIDS with little improvement, now over the past 2 days knee pain worse, difficulty ambulating  NO fever, cp, sob, rash, LE edema          Prior to Admission Medications   Prescriptions Last Dose Informant Patient Reported? Taking?   pravastatin (PRAVACHOL) 10 mg tablet   Yes No   Sig: Take 10 mg by mouth daily      Facility-Administered Medications: None       Past Medical History:   Diagnosis Date   • Chronic pain     L shoulder   • Hyperlipidemia        Past Surgical History:   Procedure Laterality Date   • HYSTERECTOMY     • SMALL INTESTINE SURGERY     • VARICOSE VEIN SURGERY         Family History   Problem Relation Age of Onset   • Lung cancer Mother    • No Known Problems Father      I have reviewed and agree with the history as documented  E-Cigarette/Vaping   • E-Cigarette Use Never User      E-Cigarette/Vaping Substances     Social History     Tobacco Use   • Smoking status: Never   • Smokeless tobacco: Never   Vaping Use   • Vaping Use: Never used   Substance Use Topics   • Alcohol use: Never   • Drug use: Never       Review of Systems   Constitutional: Negative for chills and fever  HENT: Negative for hearing loss and sore throat  Respiratory: Negative for cough and shortness of breath  Cardiovascular: Negative for chest pain and leg swelling     Gastrointestinal: Negative for abdominal pain, diarrhea and vomiting  Musculoskeletal: Positive for arthralgias, gait problem and joint swelling  Negative for myalgias  Skin: Negative for color change, pallor and wound  Neurological: Negative for weakness  All other systems reviewed and are negative  Physical Exam  Physical Exam  Vitals and nursing note reviewed  Constitutional:       General: She is in acute distress (mild)  Appearance: She is well-developed  HENT:      Head: Normocephalic and atraumatic  Right Ear: External ear normal       Left Ear: External ear normal       Nose: Nose normal       Mouth/Throat:      Mouth: Mucous membranes are moist       Pharynx: Oropharynx is clear  Eyes:      Conjunctiva/sclera: Conjunctivae normal    Cardiovascular:      Rate and Rhythm: Normal rate  Pulmonary:      Effort: Pulmonary effort is normal    Musculoskeletal:         General: Swelling and tenderness present  No deformity or signs of injury  Normal range of motion  Cervical back: Normal range of motion  Comments: Mild diffuse tenderness, swelling noted to right knee, subtle warmth with no erythema, no fullness to posterior fossa, negative anterior/posterior draw sign, patient unable to fully extend due to pain and swelling  Distal neurovascular intact, no palpable cord or distal swelling or calf pain   Skin:     General: Skin is warm and dry  Capillary Refill: Capillary refill takes less than 2 seconds  Findings: No bruising or erythema  Neurological:      Mental Status: She is alert        Gait: Gait abnormal    Psychiatric:         Behavior: Behavior normal          Vital Signs  ED Triage Vitals [03/26/23 1647]   Temperature Pulse Respirations Blood Pressure SpO2   97 6 °F (36 4 °C) 74 18 126/78 100 %      Temp Source Heart Rate Source Patient Position - Orthostatic VS BP Location FiO2 (%)   Oral Monitor Sitting Left arm --      Pain Score       --           Vitals:    03/26/23 1647 BP: 126/78   Pulse: 74   Patient Position - Orthostatic VS: Sitting         Visual Acuity      ED Medications  Medications   oxyCODONE-acetaminophen (PERCOCET) 5-325 mg per tablet 1 tablet (1 tablet Oral Given 3/26/23 1720)       Diagnostic Studies  Results Reviewed     None                 XR knee 4+ views Right injury   ED Interpretation by Candy Lance PA-C (03/26 1741)   No fx, some degenerative changes                 Procedures  Procedures         ED Course                               SBIRT 22yo+    Flowsheet Row Most Recent Value   SBIRT (23 yo +)    In order to provide better care to our patients, we are screening all of our patients for alcohol and drug use  Would it be okay to ask you these screening questions? Yes Filed at: 03/26/2023 1721   Initial Alcohol Screen: US AUDIT-C     1  How often do you have a drink containing alcohol? 0 Filed at: 03/26/2023 1721   2  How many drinks containing alcohol do you have on a typical day you are drinking? 0 Filed at: 03/26/2023 1721   3a  Male UNDER 65: How often do you have five or more drinks on one occasion? 0 Filed at: 03/26/2023 1721   3b  FEMALE Any Age, or MALE 65+: How often do you have 4 or more drinks on one occassion? 0 Filed at: 03/26/2023 1721   Audit-C Score 0 Filed at: 03/26/2023 1721   KENNY: How many times in the past year have you    Used an illegal drug or used a prescription medication for non-medical reasons? Never Filed at: 03/26/2023 1721                    Medical Decision Making  2 weeks of knee pain, atraumatic, mild swelling and warmth suspect arthritic changes  Patient recently had Doppler study which was negative for DVT, x-ray here shows no acute process some degenerative changes  Ace wrap placed by me, pt does not want crutches  Stressed importance of follow-up with orthopedic doctor likely would need steroid injection to help with symptoms    Amount and/or Complexity of Data Reviewed  External Data Reviewed: notes  Details: 3/3/2023 CONCLUSION:    RIGHT LOWER LIMB  No evidence of acute or chronic deep vein thrombosis   No evidence of superficial thrombophlebitis noted  No evidence of valvular incompetence noted in the deep veins  Popliteal, posterior tibial and anterior tibial arterial Doppler waveforms are  triphasic  LEFT LOWER LIMB LIMITED  Evaluation shows no evidence of thrombus in the common femoral vein  Doppler evaluation shows a normal response to augmentation maneuvers  Radiology: ordered and independent interpretation performed  Risk  Prescription drug management  Disposition  Final diagnoses:   Acute pain of right knee   Arthritis - right knee     Time reflects when diagnosis was documented in both MDM as applicable and the Disposition within this note     Time User Action Codes Description Comment    3/26/2023  6:13 PM Levonne Boer Add [M25 561] Acute pain of right knee     3/26/2023  6:13 PM Levonne Boer Add [M19 90] Arthritis     3/26/2023  6:13 PM Levonne Boer Modify [C02 16] Arthritis right knee      ED Disposition     ED Disposition   Discharge    Condition   Stable    Date/Time   Sun Mar 26, 2023  6:13 PM    [address] discharge to home/self care                 Follow-up Information     Follow up With Specialties Details Why Contact Info Additional [facility] Orthopedic Surgery   [address] [facility] [address] [phone] [address] [address] [phone]          Patient's Medications   Discharge Prescriptions    IBUPROFEN (MOTRIN) 600 MG TABLET    Take 1 tablet (600 mg total) by mouth every 6 (six) hours as needed for mild pain       Start Date: 3/26/2023 End Date: --       Order Dose: 600 mg       Quantity: 20 tablet    Refills: 0    OXYCODONE-ACETAMINOPHEN (PERCOCET) 5-325 MG PER TABLET    Take 1 tablet by mouth every 6 (six) hours as needed for moderate pain for up to 3 days Max Daily Amount: 4 tablets       Start Date: 3/26/2023 End Date: 3/29/2023       Order Dose: 1 tablet       Quantity: 10 tablet    Refills: 0       No discharge procedures on file      PDMP Review     None          ED Provider  Electronically Signed by           Tony Capellan PA-C  03/26/23 5059

## 2023-03-30 ENCOUNTER — APPOINTMENT (OUTPATIENT)
Dept: RADIOLOGY | Facility: AMBULARY SURGERY CENTER | Age: 68
End: 2023-03-30
Attending: ORTHOPAEDIC SURGERY

## 2023-03-30 ENCOUNTER — OFFICE VISIT (OUTPATIENT)
Dept: OBGYN CLINIC | Facility: CLINIC | Age: 68
End: 2023-03-30

## 2023-03-30 DIAGNOSIS — M25.561 RIGHT KNEE PAIN, UNSPECIFIED CHRONICITY: ICD-10-CM

## 2023-03-30 DIAGNOSIS — M25.561 ACUTE PAIN OF RIGHT KNEE: Primary | ICD-10-CM

## 2023-03-30 DIAGNOSIS — M54.31 SCIATICA OF RIGHT SIDE: ICD-10-CM

## 2023-03-30 DIAGNOSIS — M17.11 PRIMARY OSTEOARTHRITIS OF RIGHT KNEE: ICD-10-CM

## 2023-03-30 RX ORDER — CYCLOBENZAPRINE HCL 5 MG
TABLET ORAL
COMMUNITY
Start: 2023-02-22

## 2023-03-30 RX ORDER — GABAPENTIN 300 MG/1
300 CAPSULE ORAL DAILY PRN
COMMUNITY
Start: 2023-03-27

## 2023-03-30 RX ORDER — TRIAMCINOLONE ACETONIDE 40 MG/ML
40 INJECTION, SUSPENSION INTRA-ARTICULAR; INTRAMUSCULAR
Status: COMPLETED | OUTPATIENT
Start: 2023-03-30 | End: 2023-03-30

## 2023-03-30 RX ORDER — CALCIUM CARBONATE/VITAMIN D3 600 MG-10
TABLET ORAL
COMMUNITY
Start: 2023-03-22

## 2023-03-30 RX ORDER — METHYLPREDNISOLONE 4 MG/1
TABLET ORAL
Qty: 1 EACH | Refills: 0 | Status: SHIPPED | OUTPATIENT
Start: 2023-03-30

## 2023-03-30 RX ADMIN — TRIAMCINOLONE ACETONIDE 40 MG: 40 INJECTION, SUSPENSION INTRA-ARTICULAR; INTRAMUSCULAR at 10:59

## 2023-03-30 NOTE — PROGRESS NOTES
Assessment:  1  Acute pain of right knee  XR knee 3 vw right non injury    Large joint arthrocentesis: R knee    Ambulatory Referral to Physical Therapy      2  Primary osteoarthritis of right knee  XR knee 3 vw right non injury    Large joint arthrocentesis: R knee    Ambulatory Referral to Physical Therapy      3  Sciatica of right side  XR knee 3 vw right non injury    Large joint arthrocentesis: R knee    Ambulatory Referral to Physical Therapy        Patient Active Problem List   Diagnosis   • Syncope   • Hypokalemia   • Generalized weakness           Plan      75 y o  female with acute right knee pain  Upon review of the right knee x-ray, a thorough history and my examination, Alicia Gordon is presenting with signs and symptoms consistent with primary osteoarthritis and sciatica  A corticosteroid injection was offered, accepted, and administered in clinic  Procedure tolerated well post injection protocol advised  Referral to physical therapy provided to address not just arthritis and nerve pain but also direct patient on better work ergonomics  A medrol dose pack was sent to patient's pharmacy  She will follow up if symptoms do not improve, discussed possible referral to spine and pain or further diagnostics for knee  Subjective:     Patient ID:    Chief Complaint:Brenda Cook 75 y o  female      HPI    Patient comes in today for initial evaluation of left knee pain  Pain started a few weeks ago denies any mechanism of injury or trauma to the knee  Pain is constant and worsening, diffuse left knee pain  Patient states her pain became so severe a few days ago she was not able to finish work  She was seen at ED 3/26/2023 and prescribed 600mg Ibuprofen and oxycodone, which she states have helped but not resolved her symptoms  Any weight bearing aggravates while rest alleviates  Denies any numbness or paresthesias       The following portions of the patient's history were reviewed and updated as appropriate: allergies, current medications, past family history, past social history, past surgical history and problem list         Social History     Socioeconomic History   • Marital status: /Civil Union     Spouse name: Not on file   • Number of children: Not on file   • Years of education: Not on file   • Highest education level: Not on file   Occupational History   • Not on file   Tobacco Use   • Smoking status: Never   • Smokeless tobacco: Never   Vaping Use   • Vaping Use: Never used   Substance and Sexual Activity   • Alcohol use: Never   • Drug use: Never   • Sexual activity: Not on file   Other Topics Concern   • Not on file   Social History Narrative   • Not on file     Social Determinants of Health     Financial Resource Strain: Not on file   Food Insecurity: Not on file   Transportation Needs: Not on file   Physical Activity: Not on file   Stress: Not on file   Social Connections: Not on file   Intimate Partner Violence: Not on file   Housing Stability: Not on file     Past Medical History:   Diagnosis Date   • Chronic pain     L shoulder   • Hyperlipidemia      Past Surgical History:   Procedure Laterality Date   • HYSTERECTOMY     • SMALL INTESTINE SURGERY     • VARICOSE VEIN SURGERY       No Known Allergies  Current Outpatient Medications on File Prior to Visit   Medication Sig Dispense Refill   • Calcium + Vitamin D3 600-10 MG-MCG TABS TOME JOY TABLETA TODOS LOS D AS     • cyclobenzaprine (FLEXERIL) 5 mg tablet TAKE 1 TABLET EVERY DAY BY ORAL ROUTE AS NEEDED       • gabapentin (NEURONTIN) 300 mg capsule Take 300 mg by mouth daily as needed     • ibuprofen (MOTRIN) 600 mg tablet Take 1 tablet (600 mg total) by mouth every 6 (six) hours as needed for mild pain 20 tablet 0   • [] oxyCODONE-acetaminophen (PERCOCET) 5-325 mg per tablet Take 1 tablet by mouth every 6 (six) hours as needed for moderate pain for up to 3 days Max Daily Amount: 4 tablets 10 tablet 0   • pravastatin (PRAVACHOL) 10 mg tablet Take 10 mg by mouth daily       No current facility-administered medications on file prior to visit  Objective:    Review of Systems   Constitutional: Negative for chills and fever  HENT: Negative for ear pain and sore throat  Eyes: Negative for pain and visual disturbance  Respiratory: Negative for cough and shortness of breath  Cardiovascular: Negative for chest pain and palpitations  Gastrointestinal: Negative for abdominal pain and vomiting  Genitourinary: Negative for dysuria and hematuria  Musculoskeletal: Negative for arthralgias and back pain  Skin: Negative for color change and rash  Neurological: Negative for seizures and syncope  All other systems reviewed and are negative  Right Knee Exam     Range of Motion   Extension: 0   Flexion: 120     Tests   Temi:  Medial - negative Lateral - negative  Varus: negative Valgus: negative  Drawer:  Anterior - negative    Posterior - negative  Patellar apprehension: negative    Other   Erythema: absent  Sensation: normal  Pulse: present  Swelling: none  Effusion: no effusion present    Comments:  (+) Bounce test  (+) slump test   TTP in between medial and lateral gastroc heads  TTP lateral patellar facet   TTP Hoffa's fat pad            Physical Exam  Vitals and nursing note reviewed  HENT:      Head: Normocephalic  Eyes:      Extraocular Movements: Extraocular movements intact  Cardiovascular:      Rate and Rhythm: Normal rate  Pulses: Normal pulses  Pulmonary:      Effort: Pulmonary effort is normal    Musculoskeletal:         General: Normal range of motion  Cervical back: Normal range of motion  Right knee: No effusion  Instability Tests: Medial Temi test negative and lateral Temi test negative  Comments: See HPI   Skin:     General: Skin is warm and dry  Neurological:      General: No focal deficit present  Mental Status: She is alert     Psychiatric:         Behavior: "Behavior normal          Large joint arthrocentesis: R knee  Universal Protocol:  Consent: Verbal consent obtained  Risks and benefits: risks, benefits and alternatives were discussed  Consent given by: patient  Timeout called at: 3/30/2023 10:59 AM   Patient understanding: patient states understanding of the procedure being performed  Patient identity confirmed: verbally with patient    Supporting Documentation  Indications: pain and diagnostic evaluation   Procedure Details  Location: knee - R knee  Needle size: 22 G  Ultrasound guidance: no  Approach: anterolateral  Medications administered: 40 mg triamcinolone acetonide 40 mg/mL    Patient tolerance: patient tolerated the procedure well with no immediate complications  Dressing:  Sterile dressing applied    2 ml Naropin (ropivacaine HCL injection)   05%             I have personally reviewed pertinent films in PACS  X-rays taken today of the right knee reviewed demonstrate mild degenerative changes, no acute osseous abnormalities  Scribe Attestation    I,:  Audie López am acting as a scribe while in the presence of the attending physician :       I,:  Patito Nazario, DO personally performed the services described in this documentation    as scribed in my presence :               Portions of the record may have been created with voice recognition software   Occasional wrong word or \"sound a like\" substitutions may have occurred due to the inherent limitations of voice recognition software   Read the chart carefully and recognize, using context, where substitutions have occurred      "

## 2023-03-30 NOTE — LETTER
March 30, 2023     Patient: Kelly Muñiz  YOB: 1955  Date of Visit: 3/30/2023      To Whom it May Concern:    Kelly Muñiz is under my professional care  Aliciaadeel Gordon was seen in my office on 3/30/2023  Alicia Gordon may return to work on 4/3/2023  If you have any questions or concerns, please don't hesitate to call           Sincerely,          August Roldan DO        CC: No Recipients

## 2023-04-27 ENCOUNTER — HOSPITAL ENCOUNTER (EMERGENCY)
Facility: HOSPITAL | Age: 68
Discharge: HOME | End: 2023-04-28
Attending: EMERGENCY MEDICINE

## 2023-04-27 ENCOUNTER — HOSPITAL ENCOUNTER (OUTPATIENT)
Dept: MRI IMAGING | Facility: HOSPITAL | Age: 68
Discharge: HOME | End: 2023-04-27

## 2023-04-27 DIAGNOSIS — M25.561 ACUTE PAIN OF RIGHT KNEE: ICD-10-CM

## 2023-04-27 DIAGNOSIS — R07.9 CHEST PAIN, UNSPECIFIED TYPE: Primary | ICD-10-CM

## 2023-04-27 LAB
BASOPHILS # BLD AUTO: 0.05 THOUSANDS/ΜL (ref 0–0.1)
BASOPHILS NFR BLD AUTO: 1 % (ref 0–1)
EOSINOPHIL # BLD AUTO: 1.07 THOUSAND/ΜL (ref 0–0.61)
EOSINOPHIL NFR BLD AUTO: 11 % (ref 0–6)
ERYTHROCYTE [DISTWIDTH] IN BLOOD BY AUTOMATED COUNT: 12.9 % (ref 11.6–15.1)
HCT VFR BLD AUTO: 43.3 % (ref 34.8–46.1)
HGB BLD-MCNC: 14.2 G/DL (ref 11.5–15.4)
IMM GRANULOCYTES # BLD AUTO: 0.04 THOUSAND/UL (ref 0–0.2)
IMM GRANULOCYTES NFR BLD AUTO: 0 % (ref 0–2)
LYMPHOCYTES # BLD AUTO: 2.91 THOUSANDS/ΜL (ref 0.6–4.47)
LYMPHOCYTES NFR BLD AUTO: 30 % (ref 14–44)
MCH RBC QN AUTO: 30.2 PG (ref 26.8–34.3)
MCHC RBC AUTO-ENTMCNC: 32.8 G/DL (ref 31.4–37.4)
MCV RBC AUTO: 92 FL (ref 82–98)
MONOCYTES # BLD AUTO: 0.85 THOUSAND/ΜL (ref 0.17–1.22)
MONOCYTES NFR BLD AUTO: 9 % (ref 4–12)
NEUTROPHILS # BLD AUTO: 4.88 THOUSANDS/ΜL (ref 1.85–7.62)
NEUTS SEG NFR BLD AUTO: 49 % (ref 43–75)
NRBC BLD AUTO-RTO: 0 /100 WBCS
PLATELET # BLD AUTO: 409 THOUSANDS/UL (ref 149–390)
PMV BLD AUTO: 9.7 FL (ref 8.9–12.7)
RBC # BLD AUTO: 4.7 MILLION/UL (ref 3.81–5.12)
WBC # BLD AUTO: 9.8 THOUSAND/UL (ref 4.31–10.16)

## 2023-04-27 RX ORDER — SUCRALFATE 1 G/1
1 TABLET ORAL ONCE
Status: COMPLETED | OUTPATIENT
Start: 2023-04-27 | End: 2023-04-27

## 2023-04-27 RX ORDER — MAGNESIUM HYDROXIDE/ALUMINUM HYDROXICE/SIMETHICONE 120; 1200; 1200 MG/30ML; MG/30ML; MG/30ML
30 SUSPENSION ORAL ONCE
Status: DISCONTINUED | OUTPATIENT
Start: 2023-04-27 | End: 2023-04-28 | Stop reason: HOSPADM

## 2023-04-27 RX ORDER — FAMOTIDINE 20 MG/1
20 TABLET, FILM COATED ORAL ONCE
Status: COMPLETED | OUTPATIENT
Start: 2023-04-27 | End: 2023-04-27

## 2023-04-27 RX ADMIN — FAMOTIDINE 20 MG: 20 TABLET, FILM COATED ORAL at 23:42

## 2023-04-27 RX ADMIN — SUCRALFATE 1 G: 1 TABLET ORAL at 23:42

## 2023-04-28 ENCOUNTER — APPOINTMENT (EMERGENCY)
Dept: RADIOLOGY | Facility: HOSPITAL | Age: 68
End: 2023-04-28

## 2023-04-28 VITALS
BODY MASS INDEX: 30.14 KG/M2 | DIASTOLIC BLOOD PRESSURE: 63 MMHG | SYSTOLIC BLOOD PRESSURE: 116 MMHG | WEIGHT: 154.32 LBS | RESPIRATION RATE: 18 BRPM | HEART RATE: 58 BPM | OXYGEN SATURATION: 95 %

## 2023-04-28 LAB
2HR DELTA HS TROPONIN: -1 NG/L
ALBUMIN SERPL BCP-MCNC: 3.9 G/DL (ref 3.5–5)
ALP SERPL-CCNC: 71 U/L (ref 34–104)
ALT SERPL W P-5'-P-CCNC: 22 U/L (ref 7–52)
ANION GAP SERPL CALCULATED.3IONS-SCNC: 3 MMOL/L (ref 4–13)
AST SERPL W P-5'-P-CCNC: 20 U/L (ref 13–39)
ATRIAL RATE: 68 BPM
BILIRUB SERPL-MCNC: 0.26 MG/DL (ref 0.2–1)
BUN SERPL-MCNC: 26 MG/DL (ref 5–25)
CALCIUM SERPL-MCNC: 8.8 MG/DL (ref 8.4–10.2)
CARDIAC TROPONIN I PNL SERPL HS: 2 NG/L
CARDIAC TROPONIN I PNL SERPL HS: 3 NG/L
CHLORIDE SERPL-SCNC: 105 MMOL/L (ref 96–108)
CO2 SERPL-SCNC: 29 MMOL/L (ref 21–32)
CREAT SERPL-MCNC: 0.66 MG/DL (ref 0.6–1.3)
GFR SERPL CREATININE-BSD FRML MDRD: 91 ML/MIN/1.73SQ M
GLUCOSE SERPL-MCNC: 91 MG/DL (ref 65–140)
P AXIS: 57 DEGREES
POTASSIUM SERPL-SCNC: 3.7 MMOL/L (ref 3.5–5.3)
PR INTERVAL: 154 MS
PROT SERPL-MCNC: 6.6 G/DL (ref 6.4–8.4)
QRS AXIS: 16 DEGREES
QRSD INTERVAL: 74 MS
QT INTERVAL: 382 MS
QTC INTERVAL: 406 MS
SODIUM SERPL-SCNC: 137 MMOL/L (ref 135–147)
T WAVE AXIS: 57 DEGREES
VENTRICULAR RATE: 68 BPM

## 2023-04-28 NOTE — DISCHARGE INSTRUCTIONS
-Please follow-up with cardiology   -Please return to the emergency department if you begin develop chest pain, high fever, shortness of breath, blue discoloration of the skin, cold sweats, continued or worsening symptoms

## 2023-04-28 NOTE — ED ATTENDING ATTESTATION
4/27/2023  IManav MD, saw and evaluated the patient  I have discussed the patient with the resident/non-physician practitioner and agree with the resident's/non-physician practitioner's findings, Plan of Care, and MDM as documented in the resident's/non-physician practitioner's note, except where noted  All available labs and Radiology studies were reviewed  I was present for key portions of any procedure(s) performed by the resident/non-physician practitioner and I was immediately available to provide assistance  At this point I agree with the current assessment done in the Emergency Department  I have conducted an independent evaluation of this patient a history and physical is as follows:    S:    Deedee Bass is a 75 y o  female who developed substernal chest pain radiating up to her upper chest immediately following an MRI study she had for her leg (as she was putting her shoes on)  She was brought over to the ED directly from the MRI suite  She reports her symptoms are improving now  O:  ED Triage Vitals [04/27/23 2248]   Temp Pulse Respirations Blood Pressure SpO2   -- 66 18 130/74 97 %      Temp src Heart Rate Source Patient Position - Orthostatic VS BP Location FiO2 (%)   -- Monitor Sitting Right arm --      Pain Score       3         Physical Exam  Vitals and nursing note reviewed  Constitutional:       General: She is in acute distress (mild)  Appearance: She is well-developed  HENT:      Head: Normocephalic and atraumatic  Eyes:      Pupils: Pupils are equal, round, and reactive to light  Neck:      Vascular: No JVD  Cardiovascular:      Rate and Rhythm: Normal rate and regular rhythm  Heart sounds: Normal heart sounds  No murmur heard  No friction rub  No gallop  Pulmonary:      Effort: Pulmonary effort is normal  No respiratory distress  Breath sounds: Normal breath sounds  No wheezing or rales  Chest:      Chest wall: No tenderness  Musculoskeletal:         General: No tenderness  Normal range of motion  Cervical back: Normal range of motion  Right knee: Swelling (mild) and effusion (small) present  Right lower leg: No edema  Left lower leg: No edema  Skin:     General: Skin is warm and dry  Neurological:      General: No focal deficit present  Mental Status: She is alert and oriented to person, place, and time  Psychiatric:         Behavior: Behavior normal          Thought Content:  Thought content normal          Judgment: Judgment normal        A/P:  Background: 75 y o  female with chest pain    Differential DX includes but is not limited to: acs/mi, pe, pleurisy, dissection, pneumonia, musculoskeletal chest pain    Plan: cardiac workup        ED Course     Labs Reviewed   CBC AND DIFFERENTIAL - Abnormal       Result Value Ref Range Status    WBC 9 80  4 31 - 10 16 Thousand/uL Final    RBC 4 70  3 81 - 5 12 Million/uL Final    Hemoglobin 14 2  11 5 - 15 4 g/dL Final    Hematocrit 43 3  34 8 - 46 1 % Final    MCV 92  82 - 98 fL Final    MCH 30 2  26 8 - 34 3 pg Final    MCHC 32 8  31 4 - 37 4 g/dL Final    RDW 12 9  11 6 - 15 1 % Final    MPV 9 7  8 9 - 12 7 fL Final    Platelets 738 (*) 198 - 390 Thousands/uL Final    nRBC 0  /100 WBCs Final    Neutrophils Relative 49  43 - 75 % Final    Immat GRANS % 0  0 - 2 % Final    Lymphocytes Relative 30  14 - 44 % Final    Monocytes Relative 9  4 - 12 % Final    Eosinophils Relative 11 (*) 0 - 6 % Final    Basophils Relative 1  0 - 1 % Final    Neutrophils Absolute 4 88  1 85 - 7 62 Thousands/µL Final    Immature Grans Absolute 0 04  0 00 - 0 20 Thousand/uL Final    Lymphocytes Absolute 2 91  0 60 - 4 47 Thousands/µL Final    Monocytes Absolute 0 85  0 17 - 1 22 Thousand/µL Final    Eosinophils Absolute 1 07 (*) 0 00 - 0 61 Thousand/µL Final    Basophils Absolute 0 05  0 00 - 0 10 Thousands/µL Final   COMPREHENSIVE METABOLIC PANEL - Abnormal    Sodium 137  135 - 147 "mmol/L Final    Potassium 3 7  3 5 - 5 3 mmol/L Final    Chloride 105  96 - 108 mmol/L Final    CO2 29  21 - 32 mmol/L Final    ANION GAP 3 (*) 4 - 13 mmol/L Final    BUN 26 (*) 5 - 25 mg/dL Final    Creatinine 0 66  0 60 - 1 30 mg/dL Final    Comment: Standardized to IDMS reference method    Glucose 91  65 - 140 mg/dL Final    Comment: If the patient is fasting, the ADA then defines impaired fasting glucose as > 100 mg/dL and diabetes as > or equal to 123 mg/dL  Calcium 8 8  8 4 - 10 2 mg/dL Final    AST 20  13 - 39 U/L Final    ALT 22  7 - 52 U/L Final    Comment: Specimen collection should occur prior to Sulfasalazine administration due to the potential for falsely depressed results  Alkaline Phosphatase 71  34 - 104 U/L Final    Total Protein 6 6  6 4 - 8 4 g/dL Final    Albumin 3 9  3 5 - 5 0 g/dL Final    Total Bilirubin 0 26  0 20 - 1 00 mg/dL Final    Comment: Use of this assay is not recommended for patients undergoing treatment with eltrombopag due to the potential for falsely elevated results  N-acetyl-p-benzoquinone imine (metabolite of Acetaminophen) will generate erroneously low results in samples for patients that have taken an overdose of Acetaminophen      eGFR 91  ml/min/1 73sq m Final    Narrative:     [place] guidelines for Chronic Kidney Disease (CKD):   •  Stage 1 with normal or high GFR (GFR > 90 mL/min/1 73 square meters)  •  Stage 2 Mild CKD (GFR = 60-89 mL/min/1 73 square meters)  •  Stage 3A Moderate CKD (GFR = 45-59 mL/min/1 73 square meters)  •  Stage 3B Moderate CKD (GFR = 30-44 mL/min/1 73 square meters)  •  Stage 4 Severe CKD (GFR = 15-29 mL/min/1 73 square meters)  •  Stage 5 End Stage CKD (GFR <15 mL/min/1 73 square meters)  Note: GFR calculation is accurate only with a steady state creatinine   HS TROPONIN I 0HR - Normal    hs TnI 0hr 3  \"Refer to ACS Flowchart\"- see link ng/L Final    Comment:                                              Initial " "(time 0) result  If >=50 ng/L, Myocardial injury suggested ;  Type of myocardial injury and treatment strategy  to be determined  If 5-49 ng/L, a delta result at 2 hours and or 4 hours will be needed to further evaluate  If <4 ng/L, and chest pain has been >3 hours since onset, patient may qualify for discharge based on the HEART score in the ED  If <5 ng/L and <3hours since onset of chest pain, a delta result at 2 hours will be needed to further evaluate  HS Troponin 99th Percentile URL of a Health Population=12 ng/L with a 95% Confidence Interval of 8-18 ng/L  Second Troponin (time 2 hours)  If calculated delta >= 20 ng/L,  Myocardial injury suggested ; Type of myocardial injury and treatment strategy to be determined  If 5-49 ng/L and the calculated delta is 5-19 ng/L, consult medical service for evaluation  Continue evaluation for ischemia on ecg and other possible etiology and repeat hs troponin at 4 hours  If delta is <5 ng/L at 2 hours, consider discharge based on risk stratification via the HEART score (if in ED), or DIMAS risk score in IP/Observation  HS Troponin 99th Percentile URL of a Health Population=12 ng/L with a 95% Confidence Interval of 8-18 ng/L    HS TROPONIN I 2HR - Normal    hs TnI 2hr 2  \"Refer to ACS Flowchart\"- see link ng/L Final    Comment:                                              Initial (time 0) result  If >=50 ng/L, Myocardial injury suggested ;  Type of myocardial injury and treatment strategy  to be determined  If 5-49 ng/L, a delta result at 2 hours and or 4 hours will be needed to further evaluate  If <4 ng/L, and chest pain has been >3 hours since onset, patient may qualify for discharge based on the HEART score in the ED  If <5 ng/L and <3hours since onset of chest pain, a delta result at 2 hours will be needed to further evaluate  HS Troponin 99th Percentile URL of a Health Population=12 ng/L with a 95% Confidence Interval of 8-18 ng/L      Second Troponin " (time 2 hours)  If calculated delta >= 20 ng/L,  Myocardial injury suggested ; Type of myocardial injury and treatment strategy to be determined  If 5-49 ng/L and the calculated delta is 5-19 ng/L, consult medical service for evaluation  Continue evaluation for ischemia on ecg and other possible etiology and repeat hs troponin at 4 hours  If delta is <5 ng/L at 2 hours, consider discharge based on risk stratification via the HEART score (if in ED), or DIMAS risk score in IP/Observation  HS Troponin 99th Percentile URL of a Health Population=12 ng/L with a 95% Confidence Interval of 8-18 ng/L  Delta 2hr hsTnI -1  <20 ng/L Final     XR chest 2 views   ED Interpretation   This film was interpreted independently by me  No infiltrate, cardiac silhouette normal, no pleural effusions or pulmonary edema  Critical Care Time  Procedures    Time reflects when diagnosis was documented in both MDM as applicable and the Disposition within this note     Time User Action Codes Description Comment    4/28/2023  2:06 AM Connie Armstrong Add [R07 9] Chest pain, unspecified type       ED Disposition     ED Disposition   Discharge    Condition   Stable    Date/Time   Fri Apr 28, 2023  2:06 AM    Comment   Kelly Muñiz discharge to home/self care                 Follow-up Information     Follow up With Specialties Details Why Contact Info Additional Information    Tashia January, [address], Nurse Practitioner Schedule an appointment as soon as possible for a visit in 2 days  [address] [facility] [phone]       [address] Emergency Department Emergency Medicine Go to  If symptoms worsen [address] [facility] [address] [facility] Emergency Department, [address], [place], [place], [address] [facility] Cardiology [facility] Cardiology Schedule an appointment as soon as possible for a visit   [phone] [address] [facility] [facility] [phone] [address] Cardiology [address] [facility], [address] [place] [address], [place] [address]

## 2023-05-01 ENCOUNTER — TELEPHONE (OUTPATIENT)
Dept: OBGYN CLINIC | Facility: CLINIC | Age: 68
End: 2023-05-01

## 2023-05-01 NOTE — TELEPHONE ENCOUNTER
Attempted to call pt to notify of [facility] with [address], [id number] and left VM for pt to return call to inform of MRI results

## 2023-05-01 NOTE — TELEPHONE ENCOUNTER
Please call the patient  Let her know that the MRI did show a meniscus tear on the inside of her knee  It also showed a very small fracture on the inside of the knee and the femur  Also evidence of a stress reaction in the tibia on the inside of the knee as well  These are due to both the weakness of the bone and the repetitive stress on the bone  Would recommend that she is nonweightbearing on the right lower extremity at this time  Can use crutches for ambulation  We will place a letter for her to be out of work given the insufficiency fracture and she should have a follow-up visit with us in 4 weeks

## 2023-05-01 NOTE — LETTER
May 1, 2023     Patient: Adaline Remedies  YOB: 1955  Date of Visit: 5/1/2023      To Whom it May Concern:    Adaline Remedies is under my professional care  Kathleen Cunningham was seen in my office on 5/1/2023  Kathleen Cunningham is to remain out of work until 6/1/2023  If you have any questions or concerns, please don't hesitate to call           Sincerely,          Merline Machuca PA-C        CC: No Recipients

## 2023-05-01 NOTE — TELEPHONE ENCOUNTER
Attempted to get  to relay msg  They are experiencing higher than normal requests  Will have to try again later  The patient is a 43y Female complaining of abdominal pain.

## 2023-05-02 NOTE — TELEPHONE ENCOUNTER
Called pt via [address], [place] [phone] and relayed 2021 Ani Coley  in detail  Pt had no f/u questions or concerns offered  She will pick work letter up at office as she did not have a fax number  Scheduled with pt for f/u appt 5/26/233 arrival 0945 for 1000 appt w/Dr Black Barboza at [facility] given  Thank you

## 2023-05-02 NOTE — TELEPHONE ENCOUNTER
Caller: patient    Doctor: Olivia Cota    Reason for call: Patient returned call to discuss MRI results    Call back#: [phone]

## 2023-05-25 ENCOUNTER — CONSULT (OUTPATIENT)
Dept: PAIN MEDICINE | Facility: CLINIC | Age: 68
End: 2023-05-25

## 2023-05-25 VITALS
BODY MASS INDEX: 30.08 KG/M2 | WEIGHT: 154 LBS | DIASTOLIC BLOOD PRESSURE: 88 MMHG | SYSTOLIC BLOOD PRESSURE: 138 MMHG | HEART RATE: 74 BPM

## 2023-05-25 DIAGNOSIS — M25.561 ACUTE PAIN OF RIGHT KNEE: Primary | ICD-10-CM

## 2023-05-25 NOTE — PROGRESS NOTES
Assessment  1  Acute pain of right knee        Plan  Ms Cici Pressley is a pleasant 69-year-old female who presents for initial evaluation regarding right knee pain and referred from Dr Paula Blue regarding suspected sciatica  Further discussion with patient today she something that sounds nice reports all pain is isolated to the right knee and denies any significant back, right lower extremity or groin pain  During today's evaluation she is demonstrating all medial and lateral joint line knee pain with MRI of the knee on April 27, 2023 demonstrating nondisplaced subchondral fracture as well as a complex tear of the posterior horn of the medial meniscus  She is due to follow-up with orthopedics tomorrow May 26, 2023  At this time we will refer back to orthopedics regarding neck steps  Nothing further to do from interventional standpoint as the majority of her pain appears to be isolated to the knee  Advised patient if her symptoms were to change would consider further work-up but for now we will follow-up as needed  My impressions and treatment recommendations were discussed in detail with the patient who verbalized understanding and had no further questions  Discharge instructions were provided  I personally saw and examined the patient and I agree with the above discussed plan of care  No orders of the defined types were placed in this encounter  No orders of the defined types were placed in this encounter  History of Present Illness    Mindi Quezada is a 75 y o  female presents to [facility] for initial evaluation regarding right groin and knee pain  Reports pain is moderate to severe and denies any significant inciting event or recent trauma  Today reports 6-8 out of 10 pain that is described as a moderate numbness, throbbing, dull ache that is impacting her quality of life and activities of daily living    She also reports intermittent lower extremity weakness but denies falls   Does not use any durable medical equipment for ambulation  Symptoms are worse with bending, sitting, exercise  Reports moderate relief with previous injections, physical therapy, exercise, heat  Denies smoking, marijuana use admits to social alcohol use  Previously tried Percocet, OxyContin, fentanyl, Vicodin with relief in her pain  Presents today for initial evaluation  I have personally reviewed and/or updated the patient's past medical history, past surgical history, family history, social history, current medications, allergies, and vital signs today  Review of Systems   Constitutional: Negative for fever and unexpected weight change  HENT: Negative for trouble swallowing  Eyes: Negative for visual disturbance  Respiratory: Negative for shortness of breath and wheezing  Cardiovascular: Negative for chest pain and palpitations  Gastrointestinal: Negative for constipation, diarrhea, nausea and vomiting  Endocrine: Negative for cold intolerance, heat intolerance and polydipsia  Genitourinary: Negative for difficulty urinating and frequency  Musculoskeletal: Positive for joint swelling  Negative for arthralgias, gait problem and myalgias  Skin: Negative for rash  Neurological: Negative for dizziness, seizures, syncope, weakness and headaches  Hematological: Does not bruise/bleed easily  Psychiatric/Behavioral: Negative for dysphoric mood  All other systems reviewed and are negative        Patient Active Problem List   Diagnosis   • Syncope   • Hypokalemia   • Generalized weakness   • Knee pain, right       Past Medical History:   Diagnosis Date   • Chronic pain     L shoulder   • Hyperlipidemia        Past Surgical History:   Procedure Laterality Date   • HYSTERECTOMY     • SMALL INTESTINE SURGERY     • VARICOSE VEIN SURGERY         Family History   Problem Relation Age of Onset   • Lung cancer Mother    • No Known Problems Father        Social History     Occupational History   • Not on file   Tobacco Use   • Smoking status: Never   • Smokeless tobacco: Never   Vaping Use   • Vaping Use: Never used   Substance and Sexual Activity   • Alcohol use: Never   • Drug use: Never   • Sexual activity: Not on file       Current Outpatient Medications on File Prior to Visit   Medication Sig   • Calcium + Vitamin D3 600-10 MG-MCG TABS TOME JOY TABLETA TODOS LOS D AS   • cyclobenzaprine (FLEXERIL) 5 mg tablet TAKE 1 TABLET EVERY DAY BY ORAL ROUTE AS NEEDED  • ibuprofen (MOTRIN) 600 mg tablet Take 1 tablet (600 mg total) by mouth every 6 (six) hours as needed for mild pain   • pravastatin (PRAVACHOL) 10 mg tablet Take 10 mg by mouth daily   • [DISCONTINUED] gabapentin (NEURONTIN) 300 mg capsule Take 300 mg by mouth daily as needed   • [DISCONTINUED] methylPREDNISolone 4 MG tablet therapy pack Use as directed on package     No current facility-administered medications on file prior to visit  No Known Allergies    Physical Exam    /88   Pulse 74   Wt 69 9 kg (154 lb)   BMI 30 08 kg/m²     Constitutional: normal, well developed, well nourished, alert, in no distress and non-toxic and no overt pain behavior    Eyes: anicteric  HEENT: grossly intact  Neck: supple, symmetric, trachea midline and no masses   Pulmonary:even and unlabored  Cardiovascular:No edema or pitting edema present  Skin:Normal without rashes or lesions and well hydrated  Psychiatric:Mood and affect appropriate  Neurologic:Cranial Nerves II-XII grossly intact  Musculoskeletal:antalgic and ambulates with cane, tenderness palpation right medial and lateral joint line knee, MMT 5 out of 5 bilateral lower extremities, sensation grossly tact to light touch, negative straight leg raise bilaterally    Imaging              MRI RIGHT KNEE     INDICATION:   M25 561: Pain in right knee      COMPARISON: Correlation is made with the prior radiograph dated 3/30/2023      TECHNIQUE: Multiplanar/multisequence MR of the right knee was performed  Imaging performed on 1 5T MRI     FINDINGS:     SUBCUTANEOUS TISSUES: Normal     JOINT EFFUSION: Trace joint effusion      BAKER'S CYST: None      MENISCI: There is a complex tear of the posterior horn of the medial meniscus with longitudinal, horizontal, and radial components extending to its junction with the body  There is mild medial extrusion of the medial meniscus  The lateral meniscus is   intact      CRUCIATE LIGAMENTS: Intact      EXTENSOR APPARATUS: Intact      COLLATERAL LIGAMENTS: Intact      ARTICULAR SURFACES: Mild chondral thinning weightbearing portion of the medial femoral tibial compartment  Mild patellar cartilage thinning      BONES: There is a tiny nondisplaced subchondral stress fracture weightbearing portion of the medial femoral condyle with mild surrounding marrow edema  There is minimal marrow edema in the medial tibial plateau likely stress response      MUSCULATURE:  Intact      IMPRESSION:     Tiny nondisplaced subchondral fracture weightbearing portion of the medial femoral condyle   Minimal stress response in the medial tibial plateau      Complex tear posterior horn of the medial meniscus extending to its junction with the body with mild medial extrusion      Mild chondral thinning medial femoral tibial compartment and along the patella      The study was marked in EPIC for significant notification

## 2023-05-26 ENCOUNTER — TELEPHONE (OUTPATIENT)
Dept: OBGYN CLINIC | Facility: HOSPITAL | Age: 68
End: 2023-05-26

## 2023-05-26 ENCOUNTER — OFFICE VISIT (OUTPATIENT)
Dept: OBGYN CLINIC | Facility: CLINIC | Age: 68
End: 2023-05-26

## 2023-05-26 ENCOUNTER — APPOINTMENT (OUTPATIENT)
Dept: LAB | Facility: AMBULARY SURGERY CENTER | Age: 68
End: 2023-05-26

## 2023-05-26 VITALS
SYSTOLIC BLOOD PRESSURE: 112 MMHG | HEART RATE: 74 BPM | HEIGHT: 60 IN | BODY MASS INDEX: 30 KG/M2 | WEIGHT: 152.8 LBS | DIASTOLIC BLOOD PRESSURE: 75 MMHG

## 2023-05-26 DIAGNOSIS — M25.561 ACUTE PAIN OF RIGHT KNEE: ICD-10-CM

## 2023-05-26 DIAGNOSIS — M84.451S SUBCHONDRAL INSUFFICIENCY FRACTURE OF CONDYLE OF RIGHT FEMUR, SEQUELA: ICD-10-CM

## 2023-05-26 DIAGNOSIS — S83.231S COMPLEX TEAR OF MEDIAL MENISCUS OF RIGHT KNEE AS CURRENT INJURY, SEQUELA: Primary | ICD-10-CM

## 2023-05-26 PROBLEM — S83.231A COMPLEX TEAR OF MEDIAL MENISCUS OF RIGHT KNEE AS CURRENT INJURY: Status: ACTIVE | Noted: 2023-05-26

## 2023-05-26 PROBLEM — M84.451A SUBCHONDRAL INSUFFICIENCY FRACTURE OF CONDYLE OF RIGHT FEMUR (HCC): Status: ACTIVE | Noted: 2023-05-26

## 2023-05-26 NOTE — PROGRESS NOTES
Assessment:  1  Complex tear of medial meniscus of right knee as current injury, sequela        2  Acute pain of right knee  Vitamin D Panel      3  Subchondral insufficiency fracture of condyle of right femur, sequela  Vitamin D Panel        Patient Active Problem List   Diagnosis   • Syncope   • Hypokalemia   • Generalized weakness   • Knee pain, right           Plan      75 y o  female with subchondral femoral condyle fracture and mensicus tear    · MRI from 4/27/2023 reviewed today with patient  · On examination patient is more symptomatic of fracture then meniscus tear, continue with non operative management at this time  · Kulwinder Box provided to assist with PTW right lower extremity  · Work note provided to be out of work for 4 weeks  · Follow up in 4 weeks repeat x-rays at that time             Subjective:     Patient ID:    Chief Complaint:Brenda Conner 75 y o  female      HPI    Patient comes in today for follow up of right knee  Patient has had pain in her anterior medial right knee since March  Recent MRI for further evaluation, patient is here to review  She notes no improvements in her pain  She has been using a cane for ambulation assistance  Patient has tried corticosteroid injection, OTC medication, and oxycodone with minimal relief  Weight bearing aggravates, pain is localized to the femoral condyle  Denies locking, catching, or instability         The following portions of the patient's history were reviewed and updated as appropriate: allergies, current medications, past family history, past social history, past surgical history and problem list         Social History     Socioeconomic History   • Marital status: /Civil Union     Spouse name: Not on file   • Number of children: Not on file   • Years of education: Not on file   • Highest education level: Not on file   Occupational History   • Not on file   Tobacco Use   • Smoking status: Never   • Smokeless tobacco: Never   Vaping Use   • Vaping Use: Never used   Substance and Sexual Activity   • Alcohol use: Never   • Drug use: Never   • Sexual activity: Not on file   Other Topics Concern   • Not on file   Social History Narrative   • Not on file     Social Determinants of Health     Financial Resource Strain: Not on file   Food Insecurity: Not on file   Transportation Needs: Not on file   Physical Activity: Not on file   Stress: Not on file   Social Connections: Not on file   Intimate Partner Violence: Not on file   Housing Stability: Not on file     Past Medical History:   Diagnosis Date   • Chronic pain     L shoulder   • Hyperlipidemia      Past Surgical History:   Procedure Laterality Date   • HYSTERECTOMY     • SMALL INTESTINE SURGERY     • VARICOSE VEIN SURGERY       No Known Allergies  Current Outpatient Medications on File Prior to Visit   Medication Sig Dispense Refill   • Calcium + Vitamin D3 600-10 MG-MCG TABS TOME JOY TABLETA TODOS LOS D AS     • cyclobenzaprine (FLEXERIL) 5 mg tablet TAKE 1 TABLET EVERY DAY BY ORAL ROUTE AS NEEDED  • ibuprofen (MOTRIN) 600 mg tablet Take 1 tablet (600 mg total) by mouth every 6 (six) hours as needed for mild pain 20 tablet 0   • pravastatin (PRAVACHOL) 10 mg tablet Take 10 mg by mouth daily       No current facility-administered medications on file prior to visit  Objective:    Review of Systems   Constitutional: Negative for chills and fever  HENT: Negative for ear pain and sore throat  Eyes: Negative for pain and visual disturbance  Respiratory: Negative for cough and shortness of breath  Cardiovascular: Negative for chest pain and palpitations  Gastrointestinal: Negative for abdominal pain and vomiting  Genitourinary: Negative for dysuria and hematuria  Musculoskeletal: Negative for arthralgias and back pain  Skin: Negative for color change and rash  Neurological: Negative for seizures and syncope  All other systems reviewed and are negative        Ortho Exam "  Unchanged since previous visit     Physical Exam  Vitals and nursing note reviewed  HENT:      Head: Normocephalic  Eyes:      Extraocular Movements: Extraocular movements intact  Cardiovascular:      Rate and Rhythm: Normal rate  Pulses: Normal pulses  Pulmonary:      Effort: Pulmonary effort is normal    Musculoskeletal:         General: Normal range of motion  Cervical back: Normal range of motion  Skin:     General: Skin is warm and dry  Neurological:      General: No focal deficit present  Mental Status: She is alert  Psychiatric:         Behavior: Behavior normal          Procedures  No Procedures performed today    I have personally reviewed pertinent films in PACS  MRI of right knee obtained 4/27/2023 demonstrates small nondisplaced subchondral stress fracture of medial femoral condyle with marrow edema  Complex tear posterior horn of the medial meniscus  Scribe Attestation    I,:  Zaheer Gloria am acting as a scribe while in the presence of the attending physician :       I,:  Chad Coffman DO personally performed the services described in this documentation    as scribed in my presence :           Portions of the record may have been created with voice recognition software   Occasional wrong word or \"sound a like\" substitutions may have occurred due to the inherent limitations of voice recognition software   Read the chart carefully and recognize, using context, where substitutions have occurred      "

## 2023-05-26 NOTE — LETTER
May 26, 2023     Patient: Socorro Carl  YOB: 1955  Date of Visit: 5/26/2023      To Whom it May Concern:    Socorro Carl is under my professional care  Vargas Pantoja was seen in my office on 5/26/2023  Vargas Pantoja will be out of work for the next 4 weeks, until follow up and clearance from my office  If you have any questions or concerns, please don't hesitate to call           Sincerely,          Douglas Dunn DO        CC: No Recipients

## 2023-06-02 LAB
25(OH)D2 SERPL-MCNC: <1 NG/ML
25(OH)D3 SERPL-MCNC: 33 NG/ML
25(OH)D3+25(OH)D2 SERPL-MCNC: 34 NG/ML

## 2023-06-23 ENCOUNTER — OFFICE VISIT (OUTPATIENT)
Dept: OBGYN CLINIC | Facility: CLINIC | Age: 68
End: 2023-06-23
Payer: COMMERCIAL

## 2023-06-23 ENCOUNTER — APPOINTMENT (OUTPATIENT)
Dept: RADIOLOGY | Facility: AMBULARY SURGERY CENTER | Age: 68
End: 2023-06-23
Attending: ORTHOPAEDIC SURGERY
Payer: COMMERCIAL

## 2023-06-23 VITALS
HEIGHT: 60 IN | BODY MASS INDEX: 30.43 KG/M2 | WEIGHT: 155 LBS | SYSTOLIC BLOOD PRESSURE: 121 MMHG | HEART RATE: 65 BPM | DIASTOLIC BLOOD PRESSURE: 77 MMHG

## 2023-06-23 DIAGNOSIS — M17.11 PRIMARY OSTEOARTHRITIS OF RIGHT KNEE: ICD-10-CM

## 2023-06-23 DIAGNOSIS — S83.231S COMPLEX TEAR OF MEDIAL MENISCUS OF RIGHT KNEE AS CURRENT INJURY, SEQUELA: Primary | ICD-10-CM

## 2023-06-23 DIAGNOSIS — S83.231S COMPLEX TEAR OF MEDIAL MENISCUS OF RIGHT KNEE AS CURRENT INJURY, SEQUELA: ICD-10-CM

## 2023-06-23 DIAGNOSIS — M84.451S SUBCHONDRAL INSUFFICIENCY FRACTURE OF CONDYLE OF RIGHT FEMUR, SEQUELA: ICD-10-CM

## 2023-06-23 DIAGNOSIS — M25.561 ACUTE PAIN OF RIGHT KNEE: ICD-10-CM

## 2023-06-23 PROCEDURE — 99212 OFFICE O/P EST SF 10 MIN: CPT | Performed by: ORTHOPAEDIC SURGERY

## 2023-06-23 PROCEDURE — 73562 X-RAY EXAM OF KNEE 3: CPT

## 2023-06-23 RX ORDER — ASPIRIN 81 MG/1
TABLET, COATED ORAL
COMMUNITY
Start: 2023-05-24

## 2023-06-23 NOTE — PROGRESS NOTES
Assessment:  1  Complex tear of medial meniscus of right knee as current injury, sequela  XR knee 3 vw right non injury    Ambulatory Referral to Physical Therapy      2  Subchondral insufficiency fracture of condyle of right femur, sequela  Ambulatory Referral to Physical Therapy      3  Acute pain of right knee  Ambulatory Referral to Physical Therapy      4  Primary osteoarthritis of right knee  Ambulatory Referral to Physical Therapy        Patient Active Problem List   Diagnosis   • Syncope   • Hypokalemia   • Generalized weakness   • Knee pain, right   • Complex tear of medial meniscus of right knee as current injury   • Subchondral insufficiency fracture of condyle of right femur ([address] )     Plan      Cristoferrayne Yolie 75 y o  female with subchondral femoral condyle fracture and meniscus tear  · PT ordered for therapeutic exercises, ROM and strengthening  · Discontinue use of walker   · Use a cane in the opposite hand  · Xrays reviewed with patient today  · New work note provided to remain out of work  · Repeat xrays at next visit - look for edema in medial femoral condyle  · Follow up in 4 weeks    Subjective:    Patient ID:    Chief Complaint  HPI    Patient comes in today with regards for follow up of right knee  She notes she stopped using the walker this week  She states her pain is much less, and she isn't feeling any stability  She states that although pain is better, it has changed  She used to only  She has tried CSI with with limited benefit  She is not having any instability      The following portions of the patient's history were reviewed and updated as appropriate: allergies, current medications, past family history, past social history, past surgical history and problem list     Social History     Socioeconomic History   • Marital status: /Civil Union     Spouse name: Not on file   • Number of children: Not on file   • Years of education: Not on file   • Highest education level: Not on file Occupational History   • Not on file   Tobacco Use   • Smoking status: Never   • Smokeless tobacco: Never   Vaping Use   • Vaping Use: Never used   Substance and Sexual Activity   • Alcohol use: Never   • Drug use: Never   • Sexual activity: Not on file   Other Topics Concern   • Not on file   Social History Narrative   • Not on file     Social Determinants of Health     Financial Resource Strain: Not on file   Food Insecurity: Not on file   Transportation Needs: Not on file   Physical Activity: Not on file   Stress: Not on file   Social Connections: Not on file   Intimate Partner Violence: Not on file   Housing Stability: Not on file     Past Medical History:   Diagnosis Date   • Chronic pain     L shoulder   • Hyperlipidemia      Past Surgical History:   Procedure Laterality Date   • HYSTERECTOMY     • SMALL INTESTINE SURGERY     • VARICOSE VEIN SURGERY       No Known Allergies  Current Outpatient Medications on File Prior to Visit   Medication Sig Dispense Refill   • Aspirin Low Dose 81 MG EC tablet TOME JOY TABLETA TODOS LOS D AS EN LA NANDA CLEARY     • Calcium + Vitamin D3 600-10 MG-MCG TABS TOME JOY TABLETA TODOS LOS D AS     • cyclobenzaprine (FLEXERIL) 5 mg tablet TAKE 1 TABLET EVERY DAY BY ORAL ROUTE AS NEEDED  • ibuprofen (MOTRIN) 600 mg tablet Take 1 tablet (600 mg total) by mouth every 6 (six) hours as needed for mild pain 20 tablet 0   • pravastatin (PRAVACHOL) 10 mg tablet Take 10 mg by mouth daily       No current facility-administered medications on file prior to visit  Objective:    Review of Systems   Constitutional: Negative for chills and fever  HENT: Negative for ear pain and sore throat  Eyes: Negative for pain and visual disturbance  Respiratory: Negative for cough and shortness of breath  Cardiovascular: Negative for chest pain and palpitations  Gastrointestinal: Negative for abdominal pain and vomiting  Genitourinary: Negative for dysuria and hematuria     Musculoskeletal: "Positive for arthralgias  Negative for back pain  Skin: Negative for color change and rash  Neurological: Negative for seizures and syncope  All other systems reviewed and are negative  Right Knee Exam     Tenderness   Right knee tenderness location: Tibial plateau and Femur tenderness  Other   Erythema: absent  Scars: absent  Sensation: normal  Pulse: present  Swelling: mild    Comments:              Physical Exam    Procedures  No Procedures performed today    I have personally reviewed pertinent films in PACS  Portions of the record may have been created with voice recognition software   Occasional wrong word or \"sound a like\" substitutions may have occurred due to the inherent limitations of voice recognition software   Read the chart carefully and recognize, using context, where substitutions have occurred      "

## 2023-06-23 NOTE — LETTER
June 23, 2023     Patient: Rivka Dhaliwal  YOB: 1955  Date of Visit: 6/23/2023      To Whom it May Concern:    Rivka Dhaliwal is under my professional care  Eloise Rivera was seen in my office on 6/23/2023  Eloise Rivera will be out of work for the next 4 weeks, until follow up and clearance from my office  If you have any questions or concerns, please don't hesitate to call      Sincerely,      Tona Magaña DO

## 2023-06-26 ENCOUNTER — TELEPHONE (OUTPATIENT)
Dept: OBGYN CLINIC | Facility: HOSPITAL | Age: 68
End: 2023-06-26

## 2023-06-26 NOTE — TELEPHONE ENCOUNTER
No patient did not state what she needed exactly cause she is unsure  All she knows is that she needed more information to get approval from her job

## 2023-06-26 NOTE — TELEPHONE ENCOUNTER
Caller:     Doctor: America Disla    Reason for call: Asked if we can write a letter for her job  She states the letter that was given to her does not have enough information   Once the letter is completed, please call patient     Call back#: [phone]

## 2023-06-26 NOTE — TELEPHONE ENCOUNTER
Did she state what she needed in the letter? Fyi for future please get all the information that is needed so that we can help the patient in a timely manner  Patient will be called to get information that is needed for work note

## 2023-06-27 NOTE — TELEPHONE ENCOUNTER
Called patient and she stated that she thinks that a signature is needed on the letter  I will faxed a signed letter to [facility] as requested by patient

## 2023-07-10 ENCOUNTER — EVALUATION (OUTPATIENT)
Dept: PHYSICAL THERAPY | Facility: REHABILITATION | Age: 68
End: 2023-07-10
Payer: COMMERCIAL

## 2023-07-10 DIAGNOSIS — S83.231S COMPLEX TEAR OF MEDIAL MENISCUS OF RIGHT KNEE AS CURRENT INJURY, SEQUELA: ICD-10-CM

## 2023-07-10 DIAGNOSIS — M25.561 ACUTE PAIN OF RIGHT KNEE: ICD-10-CM

## 2023-07-10 DIAGNOSIS — M17.11 PRIMARY OSTEOARTHRITIS OF RIGHT KNEE: ICD-10-CM

## 2023-07-10 DIAGNOSIS — M84.451S SUBCHONDRAL INSUFFICIENCY FRACTURE OF CONDYLE OF RIGHT FEMUR, SEQUELA: ICD-10-CM

## 2023-07-10 PROCEDURE — 97161 PT EVAL LOW COMPLEX 20 MIN: CPT

## 2023-07-10 PROCEDURE — 97116 GAIT TRAINING THERAPY: CPT

## 2023-07-10 PROCEDURE — 97110 THERAPEUTIC EXERCISES: CPT

## 2023-07-10 NOTE — PROGRESS NOTES
PT Evaluation     Today's date: 7/10/2023  Patient name: Jos Lee  : [id number]  MRN: [id number]  Referring provider: Demetrius Garcia DO  Dx:   Encounter Diagnosis     ICD-10-CM    1. Complex tear of medial meniscus of right knee as current injury, sequela  S83.231S Ambulatory Referral to Physical Therapy      2. Subchondral insufficiency fracture of condyle of right femur, sequela  M84.451S Ambulatory Referral to Physical Therapy      3. Acute pain of right knee  M25.561 Ambulatory Referral to Physical Therapy      4. Primary osteoarthritis of right knee  M17.11 Ambulatory Referral to Physical Therapy                     Assessment  Assessment details: Pt is a 75 y.o. female who presents to OP PT for IE following referral for R medial meniscus complete tear, R knee OA, R knee pain, and subchondral insufficiency fracture R femoral condyle. Pt c/o R knee pain that worsens w/ walking (is currently able to go about 1/2 block before pain increases). Upon formal assessment pt demonstrates, R knee ROM and patellar motion WNL. R hip and knee strength grossly 4-/5. Gait appears antalgic w/ decreased R stance time, decreased L stride length, and B foot flat contact. Given these findings pt is recommended for skilled PT intervention 2x wk for 2 weeks followed by 1x week to help establish routine and conserve limited visits (19 visits remaining day of IE). Pt agreeable to POC. HEP given and completed in clinic, handout (in [facility] [place]) and TB provided. SPC height adjusted and educated on contralateral UE use to help w/ offloading. Understanding of Dx/Px/POC: good   Prognosis: good    Goals  STGs (to be achieved within 2-4 weeks)  1. Pt will report no > 2/10 pain w/ all activities to indicate a significant improvement in activity tolerance and pain. 2. Pt will improve B HS flexibility to help decrease pain and increase ease w/ functional mobility. LTGs (to be achieved within 6-8 weeks)   1.  Pt will be I with HEP at d/c to promote PT carry-over. 2. Pt will meet FOTO predicted score. 3. Pt will improve R hip and knee strength to 4+/5 or greater to increase ease w/ functional mobility. 4. Pt will be able to tolerate standing and walking for at least 1 hour to help facilitate RTW. Plan  Planned modality interventions: unattended electrical stimulation, ultrasound, traction, thermotherapy: hydrocollator packs and cryotherapy  Planned therapy interventions: therapeutic activities, therapeutic exercise, gait training, manual therapy and neuromuscular re-education  Frequency: 2x week  Duration in weeks: 8  Treatment plan discussed with: patient         Subjective Evaluation    History of Present Illness  Mechanism of injury: Pt presents to PT w/ c/o R knee pain. Symptoms have been present for several months; have been fluctuating. Came to PT here at this clinic for IE in April, however, was not having pain on that day and determined not to be appropriate candidate for PT at that time. LOIS: unknown. Imaging reveals R medial meniscus complete tear and R knee OA. No discussions of surgery at this time. Injections have not been helpful, alleviates pain for 2-3 days. Does note swelling in knee w/ prolonged WBing. Denies popping, clicking, catching, locking, or instability. Pt rates pain as 3-4/10 W, B, and C. Aggravating factors include: walking > 1/2 block  Alleviating factors include: sitting  She is now supposed to be using [address] and is out of work per physician order. Has goals to RTW, works in [employer] on [employer] floor. Is unable to get accommodations at work.           Objective     Active Range of Motion   Left Knee   Normal active range of motion    Right Knee   Normal active range of motion    Mobility   Patellar Mobility:     Right Knee   WFL: medial, lateral, superior and inferior    Strength/Myotome Testing     Left Hip   Planes of Motion   Flexion: 4+  Extension: 4  Abduction: 4-    Right Hip   Planes of Motion Flexion: 4  Extension: 4-  Abduction: 4-    Left Knee   Flexion: 4  Extension: 4    Right Knee   Flexion: 4-  Extension: 4-    Ambulation     Observational Gait   Gait: antalgic   Decreased right stance time and left step length. Left foot contact pattern: foot flat  Right foot contact pattern: foot flat    Additional Observational Gait Details  Ambulates w/ SPC (presented w/ cane in RUE, advised to utilize in LUE & height adjusted)    Functional Assessment      Squat    Left valgus, left tibial anterior translation beyond toes, right valgus and right tibial anterior translation beyond toes.      Outcome measure IE   HS flexibility (passive SLR, hip flexion measure) L: 75 degrees  R: 60 degrees                      Precautions: h/o syncope     * indicates included in HEP: STS repeats (0UE), sidestepping w/ TB, HS stretch      Manuals 7/10                                                                Neuro Re-Ed             SLS             Tandem amb                                                                              Ther Ex             Pt education POC, HEP, SPC use/sizing            Bike vs TM             STS repeats * 2x10            HS stretch * 3x30" B            Sidestepping w/ TB * 20' x 2            Leg press             Matrix leg extension              Bridges             Clamshells             Reverse clamshells                          Ther Activity             Step-ups             Lateral step-ups             Box lift w/ squat                          Gait Training             W/ SPC Cane sized & advised to use LUE to hold                         Modalities

## 2023-07-14 ENCOUNTER — OFFICE VISIT (OUTPATIENT)
Dept: PHYSICAL THERAPY | Facility: REHABILITATION | Age: 68
End: 2023-07-14
Payer: COMMERCIAL

## 2023-07-14 DIAGNOSIS — M25.561 ACUTE PAIN OF RIGHT KNEE: Primary | ICD-10-CM

## 2023-07-14 DIAGNOSIS — S83.231D COMPLEX TEAR OF MEDIAL MENISCUS OF RIGHT KNEE AS CURRENT INJURY, SUBSEQUENT ENCOUNTER: ICD-10-CM

## 2023-07-14 DIAGNOSIS — M84.451D SUBCHONDRAL INSUFFICIENCY FRACTURE OF CONDYLE OF RIGHT FEMUR WITH ROUTINE HEALING, SUBSEQUENT ENCOUNTER: ICD-10-CM

## 2023-07-14 PROCEDURE — 97530 THERAPEUTIC ACTIVITIES: CPT

## 2023-07-14 PROCEDURE — 97110 THERAPEUTIC EXERCISES: CPT

## 2023-07-14 PROCEDURE — 97112 NEUROMUSCULAR REEDUCATION: CPT

## 2023-07-14 NOTE — PROGRESS NOTES
Daily Note     Today's date: 2023  Patient name: Kareem Berry  :   MRN: [id number]  Referring provider: Joe Cuevas DO  Dx:   Encounter Diagnosis     ICD-10-CM    1. Acute pain of right knee  M25.561       2. Complex tear of medial meniscus of right knee as current injury, subsequent encounter  S83.835D       3. Subchondral insufficiency fracture of condyle of right femur with routine healing, subsequent encounter  M84.791D                      Subjective: patient states she has mild pain at rest but when she tries to walk a lot she gets more pain      Objective: See treatment diary below      Assessment: Tolerated treatment well. Patient demonstrated fatigue post treatment, exhibited good technique with therapeutic exercises and would benefit from continued PT Patient with mild discomfort with palpation to medial joint line      Plan: Continue per plan of care.       Precautions: h/o syncope     * indicates included in HEP: STS repeats (Bill Citron), sidestepping w/ TB, HS stretch      Manuals 7/10 7/14                                                               Neuro Re-Ed             SLS             Tandem amb                                                                              Ther Ex             Pt education POC, HEP, SPC use/sizing            Bike vs TM  5'           STS repeats * 2x10            HS stretch * 3x30" B            Sidestepping w/ TB * 20' x 2            Leg press             Matrix leg extension              Bridges  3x10           Clamshells             Reverse clamshells             SLR  3x10           Ther Activity             Step-ups             Lateral step-ups             Box lift w/ squat             Heel raises  3x10           Gait Training             W/ SPC Cane sized & advised to use LUE to hold                         Modalities

## 2023-07-17 ENCOUNTER — HOSPITAL ENCOUNTER (OUTPATIENT)
Dept: RADIOLOGY | Age: 68
Discharge: HOME | End: 2023-07-17
Payer: COMMERCIAL

## 2023-07-17 DIAGNOSIS — M81.0 AGE-RELATED OSTEOPOROSIS WITHOUT CURRENT PATHOLOGICAL FRACTURE: ICD-10-CM

## 2023-07-17 DIAGNOSIS — Z78.0 ASYMPTOMATIC MENOPAUSAL STATE: ICD-10-CM

## 2023-07-17 PROCEDURE — 77080 DXA BONE DENSITY AXIAL: CPT

## 2023-07-18 ENCOUNTER — OFFICE VISIT (OUTPATIENT)
Dept: PHYSICAL THERAPY | Facility: REHABILITATION | Age: 68
End: 2023-07-18
Payer: COMMERCIAL

## 2023-07-18 DIAGNOSIS — M84.451D SUBCHONDRAL INSUFFICIENCY FRACTURE OF CONDYLE OF RIGHT FEMUR WITH ROUTINE HEALING, SUBSEQUENT ENCOUNTER: ICD-10-CM

## 2023-07-18 DIAGNOSIS — S83.231D COMPLEX TEAR OF MEDIAL MENISCUS OF RIGHT KNEE AS CURRENT INJURY, SUBSEQUENT ENCOUNTER: ICD-10-CM

## 2023-07-18 DIAGNOSIS — M25.561 ACUTE PAIN OF RIGHT KNEE: Primary | ICD-10-CM

## 2023-07-18 DIAGNOSIS — M17.11 PRIMARY OSTEOARTHRITIS OF RIGHT KNEE: ICD-10-CM

## 2023-07-18 PROCEDURE — 97530 THERAPEUTIC ACTIVITIES: CPT

## 2023-07-18 PROCEDURE — 97110 THERAPEUTIC EXERCISES: CPT

## 2023-07-18 PROCEDURE — 97112 NEUROMUSCULAR REEDUCATION: CPT

## 2023-07-18 NOTE — PROGRESS NOTES
Daily Note     Today's date: 2023  Patient name: Mak Oreilly  :   MRN: [id number]  Referring provider: Preeti Caballero DO  Dx:   Encounter Diagnosis     ICD-10-CM    1. Acute pain of right knee  M25.561       2. Complex tear of medial meniscus of right knee as current injury, subsequent encounter  S83.411D       3. Subchondral insufficiency fracture of condyle of right femur with routine healing, subsequent encounter  M84.854D       4. Primary osteoarthritis of right knee  M17.11                      Subjective: patient states she still gets a full feeling in her knee with walking       Objective: See treatment diary below      Assessment: Tolerated treatment well. Patient demonstrated fatigue post treatment, exhibited good technique with therapeutic exercises and would benefit from continued PT Patient continues to tolerate strengthening and stretching well but she continues to get swelling in knee      Plan: Continue per plan of care.       Precautions: h/o syncope     * indicates included in HEP: STS repeats (Min Oneil), sidestepping w/ TB, HS stretch      Manuals 7/10 7/14 7/17                                                              Neuro Re-Ed             SLS             Tandem amb                                                                              Ther Ex             Pt education POC, HEP, SPC use/sizing            Bike vs TM  5' 5'          STS repeats * 2x10            HS stretch * 3x30" B            Sidestepping w/ TB * 20' x 2            Leg press             Matrix leg extension              Bridges  3x10           Quad s   3x20s          Clamshells   Pink 3x10           Reverse clamshells             SLR  3x10           Ther Activity             Step-ups             Lateral step-ups             Box lift w/ squat             Heel raises  3x10 3x10          Gait Training             W/ SPC Cane sized & advised to use LUE to hold                         Modalities

## 2023-07-20 ENCOUNTER — OFFICE VISIT (OUTPATIENT)
Dept: PHYSICAL THERAPY | Facility: REHABILITATION | Age: 68
End: 2023-07-20
Payer: COMMERCIAL

## 2023-07-20 DIAGNOSIS — M84.451S SUBCHONDRAL INSUFFICIENCY FRACTURE OF CONDYLE OF RIGHT FEMUR, SEQUELA: ICD-10-CM

## 2023-07-20 DIAGNOSIS — S83.231S COMPLEX TEAR OF MEDIAL MENISCUS OF RIGHT KNEE AS CURRENT INJURY, SEQUELA: ICD-10-CM

## 2023-07-20 DIAGNOSIS — S83.231D COMPLEX TEAR OF MEDIAL MENISCUS OF RIGHT KNEE AS CURRENT INJURY, SUBSEQUENT ENCOUNTER: ICD-10-CM

## 2023-07-20 DIAGNOSIS — M25.561 ACUTE PAIN OF RIGHT KNEE: Primary | ICD-10-CM

## 2023-07-20 DIAGNOSIS — M84.451D SUBCHONDRAL INSUFFICIENCY FRACTURE OF CONDYLE OF RIGHT FEMUR WITH ROUTINE HEALING, SUBSEQUENT ENCOUNTER: ICD-10-CM

## 2023-07-20 PROCEDURE — 97110 THERAPEUTIC EXERCISES: CPT

## 2023-07-20 PROCEDURE — 97112 NEUROMUSCULAR REEDUCATION: CPT

## 2023-07-20 PROCEDURE — 97530 THERAPEUTIC ACTIVITIES: CPT

## 2023-07-20 NOTE — PROGRESS NOTES
Daily Note     Today's date: 2023  Patient name: Malissa Severin  :   MRN: [id number]  Referring provider: Gerson Covarrubias DO  Dx:   Encounter Diagnosis     ICD-10-CM    1. Acute pain of right knee  M25.561       2. Complex tear of medial meniscus of right knee as current injury, subsequent encounter  S83.231D       3. Subchondral insufficiency fracture of condyle of right femur with routine healing, subsequent encounter  M84.451D       4. Complex tear of medial meniscus of right knee as current injury, sequela  S83.231S       5. Subchondral insufficiency fracture of condyle of right femur, sequela  M84.451S                      Subjective: patient states she is maybe doing a little better but she is still having issues with walking longer distances and stairs      Objective: See treatment diary below      Assessment: Tolerated treatment well. Patient demonstrated fatigue post treatment, exhibited good technique with therapeutic exercises and would benefit from continued PT Patient with improving knee ROM and strength. Patient still having issues with walking longer distances and stair climbing. Patient following up with  tomorrow      Plan: Continue per plan of care.       Precautions: h/o syncope     * indicates included in HEP: STS repeats (Mikki Mccarty), sidestepping w/ TB, HS stretch      Manuals 7/10 7/14 7/17 7/20                                                             Neuro Re-Ed             SLS             Tandem amb                                                                              Ther Ex             Pt education POC, HEP, SPC use/sizing            Bike vs TM  5' 5' 5'         STS repeats * 2x10            HS stretch * 3x30" B            Sidestepping w/ TB * 20' x 2            Leg press             Matrix leg extension              Bridges  3x10  3x10         Quad s   3x20s 3x20s         Clamshells   Pink 3x10  Pink 3x10         Reverse clamshells             SLR  3x10  3x10 Ther Activity             Step-ups             Lateral step-ups             Box lift w/ squat             Heel raises  3x10 3x10 3x10         Gait Training             W/ SPC Cane sized & advised to use LUE to hold                         Modalities

## 2023-07-21 ENCOUNTER — APPOINTMENT (OUTPATIENT)
Dept: RADIOLOGY | Facility: AMBULARY SURGERY CENTER | Age: 68
End: 2023-07-21
Attending: ORTHOPAEDIC SURGERY
Payer: COMMERCIAL

## 2023-07-21 ENCOUNTER — OFFICE VISIT (OUTPATIENT)
Dept: OBGYN CLINIC | Facility: CLINIC | Age: 68
End: 2023-07-21
Payer: COMMERCIAL

## 2023-07-21 VITALS
SYSTOLIC BLOOD PRESSURE: 118 MMHG | DIASTOLIC BLOOD PRESSURE: 71 MMHG | HEART RATE: 67 BPM | BODY MASS INDEX: 30.43 KG/M2 | WEIGHT: 155 LBS | HEIGHT: 60 IN

## 2023-07-21 DIAGNOSIS — M25.561 ACUTE PAIN OF RIGHT KNEE: ICD-10-CM

## 2023-07-21 DIAGNOSIS — S83.231S COMPLEX TEAR OF MEDIAL MENISCUS OF RIGHT KNEE AS CURRENT INJURY, SEQUELA: ICD-10-CM

## 2023-07-21 DIAGNOSIS — M84.452K: Primary | ICD-10-CM

## 2023-07-21 DIAGNOSIS — M17.11 PRIMARY OSTEOARTHRITIS OF RIGHT KNEE: ICD-10-CM

## 2023-07-21 DIAGNOSIS — M81.0 AGE RELATED OSTEOPOROSIS, UNSPECIFIED PATHOLOGICAL FRACTURE PRESENCE: ICD-10-CM

## 2023-07-21 PROBLEM — M84.452A SUBCHONDRAL INSUFFICIENCY FRACTURE OF CONDYLE OF LEFT FEMUR (HCC): Status: ACTIVE | Noted: 2023-07-21

## 2023-07-21 PROCEDURE — 99214 OFFICE O/P EST MOD 30 MIN: CPT | Performed by: ORTHOPAEDIC SURGERY

## 2023-07-21 PROCEDURE — 73562 X-RAY EXAM OF KNEE 3: CPT

## 2023-07-21 NOTE — LETTER
July 21, 2023     Patient: Sylwia Hunter  YOB: 1955  Date of Visit: 7/21/2023      To Whom it May Concern:    Sylwia Hunter is under my professional care. Lio Tilley was seen in my office on 7/21/2023. Lio Tilley may return to work on 7/24/2023 with restrictions: May perform sedentary work only. Please refer to Social Security Administration's definition of sedentary work for further details of what this entails. If the employer is unable to accommodate these restrictions, then the patient therefore will not be able to return to work until the restrictions are modified or removed . If you have any questions or concerns, please don't hesitate to call.          Sincerely,          Louise Juárez,         CC: No Recipients
The patient has been re-examined and I agree with the above assessment or I updated with my findings.

## 2023-07-21 NOTE — PROGRESS NOTES
Assessment:  1. Subchondral insufficiency fracture of condyle of left femur with nonunion, subsequent encounter  Osteogenesic Stimulator      2. Complex tear of medial meniscus of right knee as current injury, sequela  XR knee 3 vw right non injury      3. Acute pain of right knee  XR knee 3 vw right non injury      4. Primary osteoarthritis of right knee  XR knee 3 vw right non injury      5.  Age related osteoporosis, unspecified pathological fracture presence  Ambulatory Referral to Family Practice        Patient Active Problem List   Diagnosis   • Syncope   • Hypokalemia   • Generalized weakness   • Knee pain, right   • Complex tear of medial meniscus of right knee as current injury   • Subchondral insufficiency fracture of condyle of right femur (HCC)   • Subchondral insufficiency fracture of condyle of left femur ([address])     Plan      [facility] 75 y.o. female with right knee subchondral femoral condyle fracture and meniscus tear    Referral to family doctor for treatment of her significant osteoporosis which was reviewed with her today from her recent DEXA scan  Overall patient appears to be slowly improving clinically but is still pinpoint tender over the site of the subchondral insufficiency fracture  Her meniscal testing today was negative suggesting that the meniscus is not source of pain rather the insufficiency fracture and associated bone edema is still causing her some pain as she ambulates for greater distances  Given that has been over 3 months and still having pain and has significant radiographic change we will order a bone stimulator for her as well  We will have her follow-up in 6 weeks for repeat evaluation and repeat x-ray of the knee  She may return to work on sedentary duty at this time  Advised patient that she can discuss with her physical therapist and think about if she is improving or not with PT over a longer period of time if not then she may discontinue this but would encourage her to continue with some form of home exercise program given her significantly decreased bone density she would benefit from weightbearing exercises    Subjective:    Patient ID:    Chief Complaint  HPI    Patient comes in today with regards for follow up of right knee. She notes she has been using the walker but she has been using in the right hand. We instructed her today and using in the left hand. Also she has been walking further distances but gets pain after about 20 feet of walking. She has pain she describes all around the knee but primarily in the medial aspect the knee. Denies any new injuries.     The following portions of the patient's history were reviewed and updated as appropriate: allergies, current medications, past family history, past social history, past surgical history and problem list.    Social History     Socioeconomic History   • Marital status: /Civil Union     Spouse name: Not on file   • Number of children: Not on file   • Years of education: Not on file   • Highest education level: Not on file   Occupational History   • Not on file   Tobacco Use   • Smoking status: Never   • Smokeless tobacco: Never   Vaping Use   • Vaping Use: Never used   Substance and Sexual Activity   • Alcohol use: Never   • Drug use: Never   • Sexual activity: Not on file   Other Topics Concern   • Not on file   Social History Narrative   • Not on file     Social Determinants of Health     Financial Resource Strain: Not on file   Food Insecurity: Not on file   Transportation Needs: Not on file   Physical Activity: Not on file   Stress: Not on file   Social Connections: Not on file   Intimate Partner Violence: Not on file   Housing Stability: Not on file     Past Medical History:   Diagnosis Date   • Chronic pain     L shoulder   • Hyperlipidemia      Past Surgical History:   Procedure Laterality Date   • HYSTERECTOMY     • SMALL INTESTINE SURGERY     • VARICOSE VEIN SURGERY       No Known Allergies  Current Outpatient Medications on File Prior to Visit   Medication Sig Dispense Refill   • Aspirin Low Dose 81 MG EC tablet TOME JOY TABLETA TODOS LOS D AS EN LA NANDA CLEARY     • Calcium + Vitamin D3 600-10 MG-MCG TABS TOME JOY TABLETA TODOS LOS D AS     • cyclobenzaprine (FLEXERIL) 5 mg tablet TAKE 1 TABLET EVERY DAY BY ORAL ROUTE AS NEEDED. • ibuprofen (MOTRIN) 600 mg tablet Take 1 tablet (600 mg total) by mouth every 6 (six) hours as needed for mild pain 20 tablet 0   • pravastatin (PRAVACHOL) 10 mg tablet Take 10 mg by mouth daily       No current facility-administered medications on file prior to visit. Objective:    Review of Systems  Right Knee Exam     Tenderness   Right knee tenderness location: Palpation of the medial femoral condyle and medial tibia at the midline. Range of Motion   The patient has normal right knee ROM. Other   Erythema: absent  Scars: absent  Sensation: normal  Pulse: present  Swelling: mild  Effusion: no effusion present    Comments:    Negative Thessaly test          Physical Exam  Musculoskeletal:      Right knee: No effusion. Procedures  No Procedures performed today    X-ray of the right knee is performed today this demonstrates significantly demineralized bone particularly in the medial femoral condyle no acute fracture no significant arthritic change    Portions of the record may have been created with voice recognition software.  Occasional wrong word or "sound a like" substitutions may have occurred due to the inherent limitations of voice recognition software.  Read the chart carefully and recognize, using context, where substitutions have occurred.

## 2023-07-25 ENCOUNTER — OFFICE VISIT (OUTPATIENT)
Dept: PHYSICAL THERAPY | Facility: REHABILITATION | Age: 68
End: 2023-07-25
Payer: COMMERCIAL

## 2023-07-25 DIAGNOSIS — M17.11 PRIMARY OSTEOARTHRITIS OF RIGHT KNEE: ICD-10-CM

## 2023-07-25 DIAGNOSIS — M84.451S SUBCHONDRAL INSUFFICIENCY FRACTURE OF CONDYLE OF RIGHT FEMUR, SEQUELA: Primary | ICD-10-CM

## 2023-07-25 DIAGNOSIS — S83.231D COMPLEX TEAR OF MEDIAL MENISCUS OF RIGHT KNEE AS CURRENT INJURY, SUBSEQUENT ENCOUNTER: ICD-10-CM

## 2023-07-25 DIAGNOSIS — M25.561 ACUTE PAIN OF RIGHT KNEE: ICD-10-CM

## 2023-07-25 PROCEDURE — 97530 THERAPEUTIC ACTIVITIES: CPT

## 2023-07-25 PROCEDURE — 97110 THERAPEUTIC EXERCISES: CPT

## 2023-07-25 NOTE — PROGRESS NOTES
Daily Note     Today's date: 2023  Patient name: Erma Sandoval  : [id number]  MRN: [id number]  Referring provider: Elizabeth Puga DO  Dx:   Encounter Diagnosis     ICD-10-CM    1. Subchondral insufficiency fracture of condyle of right femur, sequela  M84.451S       2. Primary osteoarthritis of right knee  M17.11       3. Complex tear of medial meniscus of right knee as current injury, subsequent encounter  S83.153D       4. Acute pain of right knee  M25.561                      Subjective: patient states she has been in a lot more pain the last 2 days      Objective: See treatment diary below      Assessment: Tolerated treatment well. Patient demonstrated fatigue post treatment, exhibited good technique with therapeutic exercises and would benefit from continued PT Patient with a lot more pain today. We are going to place PT on hold for 2 weeks and check back in. Patient is doing to start medications for osteoporosis      Plan: Continue per plan of care.       Precautions: h/o syncope     * indicates included in HEP: STS repeats (Tash Varghese), sidestepping w/ TB, HS stretch      Manuals 7/10 7/14 7/17 7/20 7/25                                                            Neuro Re-Ed             SLS             Tandem amb                                                                              Ther Ex             Pt education POC, HEP, SPC use/sizing    discussed future steps        Bike vs TM  5' 5' 5' 5'        STS repeats * 2x10            HS stretch * 3x30" B            Sidestepping w/ TB * 20' x 2            Leg press             Matrix leg extension              Bridges  3x10  3x10         Quad s   3x20s 3x20s         Clamshells   Pink 3x10  Pink 3x10         Reverse clamshells             SLR  3x10  3x10         Ther Activity             Step-ups             Lateral step-ups             Box lift w/ squat             Heel raises  3x10 3x10 3x10 3x10        Gait Training             W/ SPC Cane sized & advised to use LUE to hold                         Modalities

## 2023-07-27 ENCOUNTER — APPOINTMENT (OUTPATIENT)
Dept: PHYSICAL THERAPY | Facility: REHABILITATION | Age: 68
End: 2023-07-27
Payer: COMMERCIAL

## 2023-08-01 ENCOUNTER — APPOINTMENT (OUTPATIENT)
Dept: PHYSICAL THERAPY | Facility: REHABILITATION | Age: 68
End: 2023-08-01
Payer: COMMERCIAL

## 2023-08-03 ENCOUNTER — APPOINTMENT (OUTPATIENT)
Dept: PHYSICAL THERAPY | Facility: REHABILITATION | Age: 68
End: 2023-08-03
Payer: COMMERCIAL

## 2023-08-08 ENCOUNTER — APPOINTMENT (OUTPATIENT)
Dept: PHYSICAL THERAPY | Facility: REHABILITATION | Age: 68
End: 2023-08-08
Payer: COMMERCIAL

## 2023-08-10 ENCOUNTER — OFFICE VISIT (OUTPATIENT)
Dept: PHYSICAL THERAPY | Facility: REHABILITATION | Age: 68
End: 2023-08-10
Payer: COMMERCIAL

## 2023-08-10 DIAGNOSIS — S83.231D COMPLEX TEAR OF MEDIAL MENISCUS OF RIGHT KNEE AS CURRENT INJURY, SUBSEQUENT ENCOUNTER: ICD-10-CM

## 2023-08-10 DIAGNOSIS — S83.231S COMPLEX TEAR OF MEDIAL MENISCUS OF RIGHT KNEE AS CURRENT INJURY, SEQUELA: ICD-10-CM

## 2023-08-10 DIAGNOSIS — M84.451D SUBCHONDRAL INSUFFICIENCY FRACTURE OF CONDYLE OF RIGHT FEMUR WITH ROUTINE HEALING, SUBSEQUENT ENCOUNTER: Primary | ICD-10-CM

## 2023-08-10 DIAGNOSIS — M25.561 ACUTE PAIN OF RIGHT KNEE: ICD-10-CM

## 2023-08-10 DIAGNOSIS — M17.11 PRIMARY OSTEOARTHRITIS OF RIGHT KNEE: ICD-10-CM

## 2023-08-10 PROCEDURE — 97164 PT RE-EVAL EST PLAN CARE: CPT

## 2023-08-10 PROCEDURE — 97110 THERAPEUTIC EXERCISES: CPT

## 2023-08-10 PROCEDURE — 97140 MANUAL THERAPY 1/> REGIONS: CPT

## 2023-08-10 NOTE — PROGRESS NOTES
Daily Note     Today's date: 8/10/2023  Patient name: Sylwia Hunter  :   MRN: [id number]  Referring provider: Louise Juárez DO  Dx:   Encounter Diagnosis     ICD-10-CM    1. Subchondral insufficiency fracture of condyle of right femur with routine healing, subsequent encounter  M84.451D       2. Complex tear of medial meniscus of right knee as current injury, sequela  S83.231S       3. Primary osteoarthritis of right knee  M17.11       4. Complex tear of medial meniscus of right knee as current injury, subsequent encounter  S83.231D       5. Acute pain of right knee  M25.561                      Subjective: patient states she is doing a lot better. She is now having more good days than bad days      Objective: See treatment diary below      Assessment: Tolerated treatment well. Patient demonstrated fatigue post treatment, exhibited good technique with therapeutic exercises and would benefit from continued PT Patient is doing a lot better. Patient will be placed on hold and will check in if needed      Plan: Continue per plan of care.       Precautions: h/o syncope     * indicates included in HEP: STS repeats (Ben Luz), sidestepping w/ TB, HS stretch      Manuals 7/10 7/14 7/17 7/20 7/25 8/10       PROM      done                    Re-eval      done                    Neuro Re-Ed             SLS             Tandem amb                                                                              Ther Ex             Pt education POC, HEP, SPC use/sizing    discussed future steps        Bike vs TM  5' 5' 5' 5' 5'       STS repeats * 2x10            HS stretch * 3x30" B            Sidestepping w/ TB * 20' x 2            Leg press             Matrix leg extension              Bridges  3x10  3x10         Quad s   3x20s 3x20s         Clamshells   Pink 3x10  Pink 3x10         Reverse clamshells             SLR  3x10  3x10  2x10       Ther Activity             Step-ups             Lateral step-ups             Box lift w/ squat             Heel raises  3x10 3x10 3x10 3x10        Gait Training             W/ SPC Cane sized & advised to use LUE to hold                         Modalities

## 2023-09-01 ENCOUNTER — OFFICE VISIT (OUTPATIENT)
Dept: OBGYN CLINIC | Facility: CLINIC | Age: 68
End: 2023-09-01
Payer: COMMERCIAL

## 2023-09-01 ENCOUNTER — APPOINTMENT (OUTPATIENT)
Dept: RADIOLOGY | Facility: AMBULARY SURGERY CENTER | Age: 68
End: 2023-09-01
Attending: ORTHOPAEDIC SURGERY
Payer: COMMERCIAL

## 2023-09-01 VITALS — HEIGHT: 60 IN | BODY MASS INDEX: 30.43 KG/M2 | WEIGHT: 155 LBS

## 2023-09-01 DIAGNOSIS — M25.561 RIGHT KNEE PAIN, UNSPECIFIED CHRONICITY: ICD-10-CM

## 2023-09-01 DIAGNOSIS — M80.00XG AGE-RELATED OSTEOPOROSIS WITH CURRENT PATHOLOGICAL FRACTURE WITH DELAYED HEALING, SUBSEQUENT ENCOUNTER: ICD-10-CM

## 2023-09-01 DIAGNOSIS — S83.231D COMPLEX TEAR OF MEDIAL MENISCUS OF RIGHT KNEE AS CURRENT INJURY, SUBSEQUENT ENCOUNTER: ICD-10-CM

## 2023-09-01 DIAGNOSIS — M84.451D SUBCHONDRAL INSUFFICIENCY FRACTURE OF CONDYLE OF RIGHT FEMUR WITH ROUTINE HEALING, SUBSEQUENT ENCOUNTER: Primary | ICD-10-CM

## 2023-09-01 DIAGNOSIS — M22.2X1 PATELLOFEMORAL DISORDER OF RIGHT KNEE: ICD-10-CM

## 2023-09-01 PROCEDURE — 73562 X-RAY EXAM OF KNEE 3: CPT

## 2023-09-01 PROCEDURE — 99214 OFFICE O/P EST MOD 30 MIN: CPT | Performed by: ORTHOPAEDIC SURGERY

## 2023-09-01 RX ORDER — ALENDRONATE SODIUM 70 MG/1
TABLET ORAL
COMMUNITY
Start: 2023-07-24

## 2023-09-01 RX ORDER — PSEUDOEPHED/ACETAMINOPH/DIPHEN 30MG-500MG
TABLET ORAL
COMMUNITY
Start: 2023-07-24

## 2023-09-01 RX ORDER — MELOXICAM 15 MG/1
TABLET ORAL
COMMUNITY
Start: 2023-07-24 | End: 2023-09-01 | Stop reason: SDUPTHER

## 2023-09-01 RX ORDER — MELOXICAM 15 MG/1
15 TABLET ORAL DAILY PRN
Qty: 30 TABLET | Refills: 1 | Status: SHIPPED | OUTPATIENT
Start: 2023-09-01

## 2023-09-01 RX ORDER — ATORVASTATIN CALCIUM 10 MG/1
TABLET, FILM COATED ORAL
COMMUNITY
Start: 2023-07-24

## 2023-09-01 NOTE — LETTER
September 1, 2023     Patient: Kasi Iniguez  YOB: 1955  Date of Visit: 9/1/2023      To Whom it May Concern:    Kasi Iniguez is under my professional care. Vincent Roldan was seen in my office on 9/1/2023. Vincent Roldan may return to work on 9/1/2023 with restrictions: May perform sedentary work only. Please refer to Social Security Administration's definition of sedentary work for further details of what this entails. If the employer is unable to accommodate these restrictions, then the patient therefore will not be able to return to work until the restrictions are modified or removed     If you have any questions or concerns, please don't hesitate to call.          Sincerely,          Haywood Aschoff, DO        CC: No Recipients

## 2023-09-01 NOTE — PROGRESS NOTES
Assessment:  1. Subchondral insufficiency fracture of condyle of right femur with routine healing, subsequent encounter  Osteogenesic Stimulator    meloxicam (MOBIC) 15 mg tablet      2. Right knee pain, unspecified chronicity  XR knee 3 vw right non injury      3. Complex tear of medial meniscus of right knee as current injury, subsequent encounter        4. Age-related osteoporosis with current pathological fracture with delayed healing, subsequent encounter        5. Patellofemoral disorder of right knee  Osteogenesic Stimulator    Ambulatory Referral to Physical Therapy    meloxicam (MOBIC) 15 mg tablet        Patient Active Problem List   Diagnosis   • Syncope   • Hypokalemia   • Generalized weakness   • Knee pain, right   • Complex tear of medial meniscus of right knee as current injury   • Subchondral insufficiency fracture of condyle of right femur (HCC)   • Subchondral insufficiency fracture of condyle of left femur (HCC)   • Patellofemoral disorder of right knee     Plan      Taryn Sanford 75 y.o. female with right knee subchondral femoral condyle fracture and meniscus tear    · Will re order bone stim  And spoke to the rep directly to make sure she gets this  · Primary sources of pain today are patellofemoral and subchondral insufficiency fracture of the right MFC  · Will request the PCP places her on an anabolic osteoporosis medication such as prolia or boniva and copy this note to them  · We will have her follow-up in 8 weeks for repeat evaluation and repeat x-ray of the knee, might have to consider repeat MRI of the knee, no sign of meniscal pain today  · She may return to work on sedentary duty at this time  · Referral back to PT for demonstration of mccconnell taping for right knee     Subjective:    Patient ID:    Chief Complaint  HPI    Patient comes in today with regards for follow up of right knee. She notes continues to have pain in the medial knee but also pinching pain in the anterior knee. She has pain that is better and some days worse than others. She really ambulates with use of walker. She has been to a few sessions of physical therapy. Denies any significant worsening of the medial knee pain. She is on Fosamax from her PCP. She has not received a bone stimulator.     The following portions of the patient's history were reviewed and updated as appropriate: allergies, current medications, past family history, past social history, past surgical history and problem list.    Social History     Socioeconomic History   • Marital status: /Civil Union     Spouse name: Not on file   • Number of children: Not on file   • Years of education: Not on file   • Highest education level: Not on file   Occupational History   • Not on file   Tobacco Use   • Smoking status: Never   • Smokeless tobacco: Never   Vaping Use   • Vaping Use: Never used   Substance and Sexual Activity   • Alcohol use: Never   • Drug use: Never   • Sexual activity: Not on file   Other Topics Concern   • Not on file   Social History Narrative   • Not on file     Social Determinants of Health     Financial Resource Strain: Not on file   Food Insecurity: Not on file   Transportation Needs: Not on file   Physical Activity: Not on file   Stress: Not on file   Social Connections: Not on file   Intimate Partner Violence: Not on file   Housing Stability: Not on file     Past Medical History:   Diagnosis Date   • Chronic pain     L shoulder   • Hyperlipidemia      Past Surgical History:   Procedure Laterality Date   • HYSTERECTOMY     • SMALL INTESTINE SURGERY     • VARICOSE VEIN SURGERY       No Known Allergies  Current Outpatient Medications on File Prior to Visit   Medication Sig Dispense Refill   • Acetaminophen Extra Strength 500 MG TABS TOME DOS TABLETAS POR V A ORAL CADA OCHO HORAS FOR 30 DAYS     • alendronate (FOSAMAX) 70 mg tablet TOME JOY TABLETA POR V A ORAL EVERY WEEK EN LA MA MADIHA     • Aspirin Low Dose 81 MG EC tablet TOME JOY TABLETA TODOS LOS D AS EN LA MA MADIHA     • atorvastatin (LIPITOR) 10 mg tablet TOME JOY TABLETA TODOS LOS D AS     • Calcium + Vitamin D3 600-10 MG-MCG TABS TOME JOY TABLETA TODOS LOS D AS     • cyclobenzaprine (FLEXERIL) 5 mg tablet TAKE 1 TABLET EVERY DAY BY ORAL ROUTE AS NEEDED. • pravastatin (PRAVACHOL) 10 mg tablet Take 10 mg by mouth daily     • [DISCONTINUED] ibuprofen (MOTRIN) 600 mg tablet Take 1 tablet (600 mg total) by mouth every 6 (six) hours as needed for mild pain 20 tablet 0   • [DISCONTINUED] meloxicam (MOBIC) 15 mg tablet TOME JOY TABLETA TODOS LOS D AS FOR 30 DAYS       No current facility-administered medications on file prior to visit. Objective:    Review of Systems  Right Knee Exam     Muscle Strength   The patient has normal right knee strength. Tenderness   Right knee tenderness location: Palpation of the medial femoral condyle and medial tibia at the midline. Range of Motion   The patient has normal right knee ROM. Extension: normal   Flexion: normal     Tests   Temi:  Medial - negative Lateral - negative  Varus: negative Valgus: negative    Other   Erythema: absent  Scars: absent  Sensation: normal  Pulse: present  Swelling: mild  Effusion: no effusion present    Comments:    Positive patellar grind test          Physical Exam  Musculoskeletal:      Right knee: No effusion. Instability Tests: Medial Temi test negative and lateral Temi test negative. Procedures  No Procedures performed today    X-ray of the right knee is performed today this demonstrates no significant interval change in comparison to last x-ray from July 2023 of the right knee. Demineralized bone again is evident mostly in the medial femoral condyle. No displaced fracture is noted.     Portions of the record may have been created with voice recognition software.  Occasional wrong word or "sound a like" substitutions may have occurred due to the inherent limitations of voice recognition software.  Read the chart carefully and recognize, using context, where substitutions have occurred.

## 2023-09-01 NOTE — PATIENT INSTRUCTIONS
PATELLOFEMORAL SYNDROME-Nieves TAPING TECHNIQUE    Search “Leukotape P tape” and “Cover roll stretch tape” on  [employer]  Leukotape P is typically 1.5in x 15 yards, Cover roll stretch tape is typically 2in x 10 yards        How to apply:  Place cover roll tape over knee cap. This protects the skin. Apply Leukotape over the cover roll tape. Use the Leukotape to pull the knee cap to the middle of the body (medial side of knee) to prevent lateral (outside) tracking of the knee cap. Wear the tape for 3 days (72 hrs) straight, then take off one day (24 hrs) off, then repeat. Visit [employer] and search “Nieves tape for the knee” to watch a video on how to apply tape. Video titled “Nieves Taping of the knee” created by [employer] recommended. What does Nieves taping technique do? Patellofemoral syndrome is when the inside quadriceps muscle, called the VMO muscle, becomes weak due to a number of factors. This causes the Patellofemoral ligament, which is the only ligament holding the patella (knee cap) in place, to become weak as well. When the ligament becomes weak, the knee cap drifts or tracks too far to the lateral side of the knee (outside knee) which causes tension on this ligament. The knee cap hits the lateral area of the femur, resulting in pain or discomfort around the front of the knee. Physical Therapy is sometimes used to strengthen the weak muscles, such as the VMO, to correct this problem. When the tape is applied correctly, it helps to realign the knee cap to the center of the knee. This helps correct for the lateral tracking of the knee cap and relieve discomfort. You can search online for exercises that can help strengthen the VMO quadriceps muscle or attend physical therapy.

## 2023-09-11 ENCOUNTER — EVALUATION (OUTPATIENT)
Dept: PHYSICAL THERAPY | Facility: REHABILITATION | Age: 68
End: 2023-09-11
Payer: COMMERCIAL

## 2023-09-11 DIAGNOSIS — M22.2X1 PATELLOFEMORAL DISORDER OF RIGHT KNEE: ICD-10-CM

## 2023-09-11 PROCEDURE — 97112 NEUROMUSCULAR REEDUCATION: CPT

## 2023-09-11 PROCEDURE — 97110 THERAPEUTIC EXERCISES: CPT

## 2023-09-11 PROCEDURE — 97140 MANUAL THERAPY 1/> REGIONS: CPT

## 2023-09-11 NOTE — PROGRESS NOTES
PT Re-Evaluation     Today's date: 2023  Patient name: Pasquale Galindo  :   MRN: [id number]  Referring provider: Renae Landa  Dx:   Encounter Diagnosis     ICD-10-CM    1. Patellofemoral disorder of right knee  M22.2X1 Ambulatory Referral to Physical Therapy                     Assessment  Assessment details: Patient presents to PT with R knee pain. Symptoms are improving but she still has good and bad days Patient displays decreased knee strength and hip strength. These impairments are leading to pain with walking, standing and stair climbing. Patient will benefit from skilled PT to address above impairments and help them return to PLOF. Impairments: abnormal coordination, abnormal gait, abnormal muscle firing, abnormal or restricted ROM, abnormal movement, activity intolerance, impaired balance, impaired physical strength, lacks appropriate home exercise program, pain with function and weight-bearing intolerance  Understanding of Dx/Px/POC: good   Prognosis: good    Goals  STG  1. Patient will display decreased pain to 0-2 in 6 weeks  2. Patient will display knee ROM WNL in 6 weeks  3. Patient will display knee strength WNL in 6 weeks    LTG  1. Patient will be able to stand for 30 minutes without pain in 12 weeks  2. Patient will be able to walk for 30 minutes without pain in 12 weeks  3.  Patient will be able to go up/down 3 flights of stairs without pain in 12 weeks      Plan  Patient would benefit from: PT eval and skilled physical therapy  Referral necessary: Yes  Planned modality interventions: thermotherapy: hydrocollator packs, manual electrical stimulation, TENS, electrical stimulation/Russian stimulation and cryotherapy  Planned therapy interventions: activity modification, ADL training, balance, balance/weight bearing training, body mechanics training, coordination, flexibility, functional ROM exercises, gait training, graded activity, graded exercise, home exercise program, therapeutic training, therapeutic exercise, therapeutic activities, stretching, strengthening, neuromuscular re-education, motor coordination training, massage, manual therapy and joint mobilization  Frequency: 2x week  Duration in visits: 12  Duration in weeks: 6  Plan of Care beginning date: 2023  Plan of Care expiration date: 10/23/2023  Treatment plan discussed with: patient        Subjective Evaluation    History of Present Illness  Mechanism of injury: Patient states she is having good and bad days. Patient states the doctor wants her to continue PT and to go over taping techniques for the patella. Not a recurrent problem   Quality of life: good    Pain  Current pain ratin  At best pain ratin  At worst pain ratin  Quality: sharp  Relieving factors: support  Aggravating factors: standing, walking and stair climbing    Social Support  Steps to enter house: yes    Employment status: not working        Objective     Active Range of Motion     Right Knee   Normal active range of motion    Strength/Myotome Testing     Right Hip   Planes of Motion   Flexion: 4  Extension: 4  Abduction: 4  External rotation: 4    Right Knee   Flexion: 4  Extension: 4    Tests     Right Knee   Positive lateral Temi and medial Temi.      General Comments:      Knee Comments  R calf/hamstring tightness noted             Precautions: h/o syncope     * indicates included in HEP: STS repeats (Ilana Augusta), sidestepping w/ TB, HS stretch      Manuals 7/10 7/14 7/17 7/20 7/25 8/10 9/11      PROM      done done                   Re-eval      done                    Neuro Re-Ed             SLS             Tandem amb                                                                              Ther Ex             Pt education POC, HEP, SPC use/sizing    discussed future steps  taping      Bike vs TM  5' 5' 5' 5' 5'       STS repeats * 2x10            HS stretch * 3x30" B            Sidestepping w/ TB * 20' x 2 Leg press             Matrix leg extension              Bridges  3x10  3x10         Quad s   3x20s 3x20s         Clamshells   Pink 3x10  Pink 3x10         Reverse clamshells             SLR  3x10  3x10  2x10       Ther Activity             Step-ups             Lateral step-ups             Box lift w/ squat             Heel raises  3x10 3x10 3x10 3x10        Gait Training             W/ SPC Cane sized & advised to use LUE to hold                         Modalities

## 2023-09-13 ENCOUNTER — APPOINTMENT (OUTPATIENT)
Dept: PHYSICAL THERAPY | Facility: REHABILITATION | Age: 68
End: 2023-09-13
Payer: COMMERCIAL

## 2023-09-13 ENCOUNTER — OFFICE VISIT (OUTPATIENT)
Dept: PHYSICAL THERAPY | Facility: REHABILITATION | Age: 68
End: 2023-09-13
Payer: COMMERCIAL

## 2023-09-13 DIAGNOSIS — M22.2X1 PATELLOFEMORAL DISORDER OF RIGHT KNEE: Primary | ICD-10-CM

## 2023-09-13 PROCEDURE — 97110 THERAPEUTIC EXERCISES: CPT

## 2023-09-13 PROCEDURE — 97530 THERAPEUTIC ACTIVITIES: CPT

## 2023-09-13 PROCEDURE — 97112 NEUROMUSCULAR REEDUCATION: CPT

## 2023-09-13 NOTE — PROGRESS NOTES
Daily Note     Today's date: 2023  Patient name: Adiel Teran  : [birth year]  MRN: [id number]  Referring provider: Becki Watts  Dx:   Encounter Diagnosis     ICD-10-CM    1. Patellofemoral disorder of right knee  M22.2X1                      Subjective: patient states she is only having a little pain in the front of the knee today      Objective: See treatment diary below      Assessment: Tolerated treatment well. Patient demonstrated fatigue post treatment, exhibited good technique with therapeutic exercises and would benefit from continued PT Patient with continual tightness and weakness around R knee. Patient with lag with SLR      Plan: Continue per plan of care.       Precautions: h/o syncope     * indicates included in HEP: STS repeats (0UE), sidestepping w/ TB, HS stretch      Manuals 7/10 7/14 7/17 7/20 7/25 8/10 9/11 9/13     PROM      done done                   Re-eval      done                    Neuro Re-Ed             SLS             Tandem amb                                                                              Ther Ex             Pt education POC, HEP, SPC use/sizing    discussed future steps  taping      Bike vs TM (strength)  5' 5' 5' 5' 5'  5'     STS repeats * 2x10            HS stretch * 3x30" B       3x30s     Sidestepping w/ TB * 20' x 2            Leg press             Matrix leg extension              Bridges  3x10  3x10    3x10     Quad s   3x20s 3x20s    3x20s     Clamshells   Pink 3x10  Pink 3x10         Reverse clamshells             SLR  3x10  3x10  2x10  3x10     Ther Activity             Step-ups             Lateral step-ups             Box lift w/ squat             Heel raises  3x10 3x10 3x10 3x10   3x10     Gait Training             W/ SPC Cane sized & advised to use LUE to hold                         Modalities

## 2023-09-15 ENCOUNTER — OFFICE VISIT (OUTPATIENT)
Dept: PHYSICAL THERAPY | Facility: REHABILITATION | Age: 68
End: 2023-09-15
Payer: COMMERCIAL

## 2023-09-15 ENCOUNTER — APPOINTMENT (OUTPATIENT)
Dept: PHYSICAL THERAPY | Facility: REHABILITATION | Age: 68
End: 2023-09-15
Payer: COMMERCIAL

## 2023-09-15 DIAGNOSIS — M22.2X1 PATELLOFEMORAL DISORDER OF RIGHT KNEE: Primary | ICD-10-CM

## 2023-09-15 PROCEDURE — 97530 THERAPEUTIC ACTIVITIES: CPT

## 2023-09-15 PROCEDURE — 97110 THERAPEUTIC EXERCISES: CPT

## 2023-09-15 PROCEDURE — 97112 NEUROMUSCULAR REEDUCATION: CPT

## 2023-09-15 NOTE — PROGRESS NOTES
Daily Note     Today's date: 9/15/2023  Patient name: Vishal Melissa  :   MRN: [id number]  Referring provider: Zoila Shaver  Dx:   Encounter Diagnosis     ICD-10-CM    1. Patellofemoral disorder of right knee  M22.2X1                      Subjective: patient states she is still having some pain but its not as intense       Objective: See treatment diary below      Assessment: Tolerated treatment well. Patient demonstrated fatigue post treatment, exhibited good technique with therapeutic exercises and would benefit from continued PT Patient continues to display significant quad weakness as seen by lag with SLR. Tightness of calf and hamstring still noted      Plan: Continue per plan of care.       Precautions: h/o syncope     * indicates included in HEP: STS repeats (0UE), sidestepping w/ TB, HS stretch      Manuals 7/10 7/14 7/17 7/20 7/25 8/10 9/11 9/13 9/15    PROM      done done                   Re-eval      done                    Neuro Re-Ed             SLS             Tandem amb             bridges         3x10                                                        Ther Ex             Pt education POC, HEP, SPC use/sizing    discussed future steps  taping      Bike vs TM (strength)  5' 5' 5' 5' 5'  5' 5'    STS repeats * 2x10            HS stretch * 3x30" B       3x30s 3x30s    Sidestepping w/ TB * 20' x 2            Leg press             Matrix leg extension              Bridges  3x10  3x10    3x10     Quad s   3x20s 3x20s    3x20s 3x20s    Clamshells   Pink 3x10  Pink 3x10     Pink 3x10    Reverse clamshells             SLR  3x10  3x10  2x10  3x10 3x10    Ther Activity             Step-ups             Lateral step-ups             Box lift w/ squat             Heel raises  3x10 3x10 3x10 3x10   3x10 3x10    Gait Training             W/ SPC Cane sized & advised to use LUE to hold                         Modalities

## 2023-09-20 ENCOUNTER — OFFICE VISIT (OUTPATIENT)
Dept: PHYSICAL THERAPY | Facility: REHABILITATION | Age: 68
End: 2023-09-20
Payer: COMMERCIAL

## 2023-09-20 DIAGNOSIS — M22.2X1 PATELLOFEMORAL DISORDER OF RIGHT KNEE: Primary | ICD-10-CM

## 2023-09-20 PROCEDURE — 97112 NEUROMUSCULAR REEDUCATION: CPT

## 2023-09-20 PROCEDURE — 97110 THERAPEUTIC EXERCISES: CPT

## 2023-09-20 PROCEDURE — 97530 THERAPEUTIC ACTIVITIES: CPT

## 2023-09-22 ENCOUNTER — OFFICE VISIT (OUTPATIENT)
Dept: PHYSICAL THERAPY | Facility: REHABILITATION | Age: 68
End: 2023-09-22
Payer: COMMERCIAL

## 2023-09-22 DIAGNOSIS — M22.2X1 PATELLOFEMORAL DISORDER OF RIGHT KNEE: Primary | ICD-10-CM

## 2023-09-22 PROCEDURE — 97530 THERAPEUTIC ACTIVITIES: CPT

## 2023-09-22 PROCEDURE — 97112 NEUROMUSCULAR REEDUCATION: CPT

## 2023-09-22 PROCEDURE — 97110 THERAPEUTIC EXERCISES: CPT

## 2023-09-22 NOTE — PROGRESS NOTES
Daily Note     Today's date: 2023  Patient name: Taryn Sanford  :   MRN: [id number]  Referring provider: Arturo Maldonado  Dx:   Encounter Diagnosis     ICD-10-CM    1. Patellofemoral disorder of right knee  M22.2X1                      Subjective: patient states that she is having good days and bad days still      Objective: See treatment diary below      Assessment: Tolerated treatment well. Patient demonstrated fatigue post treatment, exhibited good technique with therapeutic exercises and would benefit from continued PT Patient continues to display decreased quad strength as seen by lag with SLR. Patient with improving muscle length around the knee      Plan: Continue per plan of care.       Precautions: h/o syncope     * indicates included in HEP: STS repeats (0UE), sidestepping w/ TB, HS stretch      Manuals 7/10 7/14 7/17 7/20 7/25 8/10 9/11 9/13 9/15 9/20 9/22       PROM      done done                             Re-eval      done                              Neuro Re-Ed                  SLS                  Tandem amb                  bridges         3x10 3x10 3x10       sidesteps          Pink 3x10 Pink 3x10                                                             Ther Ex                  Pt education POC, HEP, SPC use/sizing    discussed future steps  taping           Bike vs TM (strength)  5' 5' 5' 5' 5'  5' 5' 5' lvl 2 5' lvl 2       STS repeats * 2x10                 HS stretch * 3x30" B       3x30s 3x30s 3x30s 3x30s       Sidestepping w/ TB * 20' x 2          Pink 3 laps       Leg press                  Matrix leg extension                   Bridges  3x10  3x10    3x10          Quad s   3x20s 3x20s    3x20s 3x20s 3x30s 3x30s       Clamshells   Pink 3x10  Pink 3x10     Pink 3x10 Pink 3x10 Pink 3x10       Reverse clamshells                  SLR  3x10  3x10  2x10  3x10 3x10 3x10 3x10       Ther Activity                  Step-ups                  Lateral step-ups Box lift w/ squat                  Heel raises  3x10 3x10 3x10 3x10   3x10 3x10 3x10 3x10       Gait Training                  W/ SPC Cane sized & advised to use LUE to hold                                   Modalities

## 2023-09-27 ENCOUNTER — OFFICE VISIT (OUTPATIENT)
Dept: PHYSICAL THERAPY | Facility: REHABILITATION | Age: 68
End: 2023-09-27
Payer: COMMERCIAL

## 2023-09-27 DIAGNOSIS — M22.2X1 PATELLOFEMORAL DISORDER OF RIGHT KNEE: Primary | ICD-10-CM

## 2023-09-27 PROCEDURE — 97530 THERAPEUTIC ACTIVITIES: CPT

## 2023-09-27 PROCEDURE — 97110 THERAPEUTIC EXERCISES: CPT

## 2023-09-27 PROCEDURE — 97112 NEUROMUSCULAR REEDUCATION: CPT

## 2023-09-27 NOTE — PROGRESS NOTES
Daily Note     Today's date: 2023  Patient name: Yara Barrera  :   MRN: [id number]  Referring provider: Kenneth Zimmer  Dx:   Encounter Diagnosis     ICD-10-CM    1. Patellofemoral disorder of right knee  M22.2X1                      Subjective: patient states now her other knee has been bothering her the last day or 2      Objective: See treatment diary below      Assessment: Tolerated treatment well. Patient demonstrated fatigue post treatment, exhibited good technique with therapeutic exercises and would benefit from continued PT R knee having good and bad days. L knee bothering her a little today due to compensation. Patient still tolerated all exercises well      Plan: Continue per plan of care.       Precautions: h/o syncope     * indicates included in HEP: STS repeats (0UE), sidestepping w/ TB, HS stretch      Manuals 7/10 7/14 7/17 7/20 7/25 8/10 9/11 9/13 9/15 9/20 9/22 9/27      PROM      done done                             Re-eval      done                              Neuro Re-Ed                  SLS                  Tandem amb                  bridges         3x10 3x10 3x10 3x10      sidesteps          Pink 3x10 Pink 3x10 Pink 3x10                                                            Ther Ex                  Pt education POC, HEP, SPC use/sizing    discussed future steps  taping           Bike vs TM (strength)  5' 5' 5' 5' 5'  5' 5' 5' lvl 2 5' lvl 2 5' lvl 2      STS repeats * 2x10                 HS stretch * 3x30" B       3x30s 3x30s 3x30s 3x30s 3x30s      Sidestepping w/ TB * 20' x 2          Pink 3 laps Pink 3 laps      Leg press                  Matrix leg extension                   Bridges  3x10  3x10    3x10          Quad s   3x20s 3x20s    3x20s 3x20s 3x30s 3x30s 3x30s      Clamshells   Pink 3x10  Pink 3x10     Pink 3x10 Pink 3x10 Pink 3x10 Pink 3x10      Reverse clamshells                  SLR  3x10  3x10  2x10  3x10 3x10 3x10 3x10 3x10      Ther Activity Step-ups                  Lateral step-ups                  Box lift w/ squat                  Heel raises  3x10 3x10 3x10 3x10   3x10 3x10 3x10 3x10 3x10      Gait Training                  W/ SPC Cane sized & advised to use LUE to hold                                   Modalities

## 2023-09-29 ENCOUNTER — OFFICE VISIT (OUTPATIENT)
Dept: PHYSICAL THERAPY | Facility: REHABILITATION | Age: 68
End: 2023-09-29
Payer: COMMERCIAL

## 2023-09-29 DIAGNOSIS — M22.2X1 PATELLOFEMORAL DISORDER OF RIGHT KNEE: Primary | ICD-10-CM

## 2023-09-29 PROCEDURE — 97110 THERAPEUTIC EXERCISES: CPT

## 2023-09-29 PROCEDURE — 97112 NEUROMUSCULAR REEDUCATION: CPT

## 2023-09-29 PROCEDURE — 97530 THERAPEUTIC ACTIVITIES: CPT

## 2023-09-29 NOTE — PROGRESS NOTES
Daily Note     Today's date: 2023  Patient name: Kanu Mancini  : [birth year]  MRN: [id number]  Referring provider: Shanelle Rosado  Dx:   Encounter Diagnosis     ICD-10-CM    1. Patellofemoral disorder of right knee  M22.2X1                      Subjective: patient states the right knee is starting to feel better. The left knee is starting to bother her a little because she has been compensating for some time now      Objective: See treatment diary below      Assessment: Tolerated treatment well. Patient demonstrated fatigue post treatment, exhibited good technique with therapeutic exercises and would benefit from continued PT Patient is displaying improving symptoms in R knee. Improving strength and flexibility noted. The left knee is starting to bother her a little because she has been compensating for some time now      Plan: Continue per plan of care.       Precautions: h/o syncope     * indicates included in HEP: STS repeats (0UE), sidestepping w/ TB, HS stretch      Manuals 7/10 7/14 7/17 7/20 7/25 8/10 9/11 9/13 9/15 9/20 9/22 9/27 9/29     PROM      done done                             Re-eval      done                              Neuro Re-Ed                  SLS                  Tandem amb                  bridges         3x10 3x10 3x10 3x10 3x10     sidesteps          Pink 3x10 Pink 3x10 Pink 3x10 Pink 3x10                                                           Ther Ex                  Pt education POC, HEP, SPC use/sizing    discussed future steps  taping           Bike vs TM (strength)  5' 5' 5' 5' 5'  5' 5' 5' lvl 2 5' lvl 2 5' lvl 2 5'      STS repeats * 2x10                 HS stretch * 3x30" B       3x30s 3x30s 3x30s 3x30s 3x30s 3x30s     Sidestepping w/ TB * 20' x 2          Pink 3 laps Pink 3 laps Pink 3 laps     Leg press                  Matrix leg extension                   Bridges  3x10  3x10    3x10          Quad s   3x20s 3x20s    3x20s 3x20s 3x30s 3x30s 3x30s 3x30s Clamshells   Pink 3x10  Pink 3x10     Pink 3x10 Pink 3x10 Pink 3x10 Pink 3x10 Pink 3x10     Reverse clamshells                  SLR  3x10  3x10  2x10  3x10 3x10 3x10 3x10 3x10 3x10     Ther Activity                  Step-ups                  Lateral step-ups                  Box lift w/ squat                  Heel raises  3x10 3x10 3x10 3x10   3x10 3x10 3x10 3x10 3x10 3x10     Gait Training                  W/ SPC Cane sized & advised to use LUE to hold                                   Modalities

## 2023-10-04 ENCOUNTER — OFFICE VISIT (OUTPATIENT)
Dept: PHYSICAL THERAPY | Facility: REHABILITATION | Age: 68
End: 2023-10-04
Payer: COMMERCIAL

## 2023-10-04 DIAGNOSIS — M22.2X1 PATELLOFEMORAL DISORDER OF RIGHT KNEE: Primary | ICD-10-CM

## 2023-10-04 PROCEDURE — 97112 NEUROMUSCULAR REEDUCATION: CPT

## 2023-10-04 PROCEDURE — 97110 THERAPEUTIC EXERCISES: CPT

## 2023-10-04 PROCEDURE — 97530 THERAPEUTIC ACTIVITIES: CPT

## 2023-10-04 PROCEDURE — 97110 THERAPEUTIC EXERCISES: CPT | Performed by: PHYSICAL THERAPIST

## 2023-10-04 PROCEDURE — 97530 THERAPEUTIC ACTIVITIES: CPT | Performed by: PHYSICAL THERAPIST

## 2023-10-04 PROCEDURE — 97112 NEUROMUSCULAR REEDUCATION: CPT | Performed by: PHYSICAL THERAPIST

## 2023-10-04 NOTE — PROGRESS NOTES
Daily Note     Today's date: 10/4/2023  Patient name: Cong Morgan  :   MRN: [id number]  Referring provider: Sumaya Mendoza  Dx:   Encounter Diagnosis     ICD-10-CM    1. Patellofemoral disorder of right knee  M22.2X1                      Subjective: patient states she is able to walk a little more this weekend without too much issue      Objective: See treatment diary below      Assessment: Tolerated treatment well. Patient demonstrated fatigue post treatment, exhibited good technique with therapeutic exercises and would benefit from continued PT Patient with improving symptoms but is still having good and bad days      Plan: Continue per plan of care.       Precautions: h/o syncope     * indicates included in HEP: STS repeats (0UE), sidestepping w/ TB, HS stretch      Manuals 7/10 7/14 7/17 7/20 7/25 8/10 9/11 9/13 9/15 9/20 9/22 9/27 9/29 10/4    PROM      done done                             Re-eval      done                              Neuro Re-Ed                  SLS                  Tandem amb                  bridges         3x10 3x10 3x10 3x10 3x10 3x10    sidesteps          Pink 3x10 Pink 3x10 Pink 3x10 Pink 3x10 Pink 3x10                                                          Ther Ex                  Pt education POC, HEP, SPC use/sizing    discussed future steps  taping           Bike vs TM (strength)  5' 5' 5' 5' 5'  5' 5' 5' lvl 2 5' lvl 2 5' lvl 2 5'  5'    STS repeats * 2x10                 HS stretch * 3x30" B       3x30s 3x30s 3x30s 3x30s 3x30s 3x30s 3x30s    Sidestepping w/ TB * 20' x 2          Pink 3 laps Pink 3 laps Pink 3 laps Pink 3 laps    Leg press                  Matrix leg extension                   Bridges  3x10  3x10    3x10          Quad s   3x20s 3x20s    3x20s 3x20s 3x30s 3x30s 3x30s 3x30s 3x30s    Clamshells   Pink 3x10  Pink 3x10     Pink 3x10 Pink 3x10 Pink 3x10 Pink 3x10 Pink 3x10 Pink 3x10    Reverse clamshells                  SLR  3x10  3x10 2x10  3x10 3x10 3x10 3x10 3x10 3x10 3x10    Ther Activity                  Step-ups                  Lateral step-ups                  Box lift w/ squat                  Heel raises  3x10 3x10 3x10 3x10   3x10 3x10 3x10 3x10 3x10 3x10 3x10    Gait Training                  W/ SPC Cane sized & advised to use LUE to hold                                   Modalities

## 2023-10-06 ENCOUNTER — OFFICE VISIT (OUTPATIENT)
Dept: PHYSICAL THERAPY | Facility: REHABILITATION | Age: 68
End: 2023-10-06
Payer: COMMERCIAL

## 2023-10-06 DIAGNOSIS — M22.2X1 PATELLOFEMORAL DISORDER OF RIGHT KNEE: Primary | ICD-10-CM

## 2023-10-06 PROCEDURE — 97110 THERAPEUTIC EXERCISES: CPT

## 2023-10-06 PROCEDURE — 97530 THERAPEUTIC ACTIVITIES: CPT

## 2023-10-06 PROCEDURE — 97112 NEUROMUSCULAR REEDUCATION: CPT | Performed by: PHYSICAL THERAPIST

## 2023-10-06 PROCEDURE — 97110 THERAPEUTIC EXERCISES: CPT | Performed by: PHYSICAL THERAPIST

## 2023-10-06 PROCEDURE — 97530 THERAPEUTIC ACTIVITIES: CPT | Performed by: PHYSICAL THERAPIST

## 2023-10-06 PROCEDURE — 97112 NEUROMUSCULAR REEDUCATION: CPT

## 2023-10-06 NOTE — PROGRESS NOTES
Daily Note     Today's date: 10/6/2023  Patient name: Yan Valdes  : [birth year]  MRN: [id number]  Referring provider: Alfred Kincaid  Dx:   Encounter Diagnosis     ICD-10-CM    1. Patellofemoral disorder of right knee  M22.2X1                      Subjective: patient states she is still having a little pain in both knees especially when she is walking      Objective: See treatment diary below      Assessment: Tolerated treatment well. Patient demonstrated fatigue post treatment, exhibited good technique with therapeutic exercises and would benefit from continued PT Due to insurance visit limit, patient will continue exercises at home1      Plan: Continue per plan of care.       Precautions: h/o syncope     * indicates included in HEP: STS repeats (0UE), sidestepping w/ TB, HS stretch      Manuals 7/10 7/14 7/17 7/20 7/25 8/10 9/11 9/13 9/15 9/20 9/22 9/27 9/29 10/4 10/6   PROM      done done                             Re-eval      done                              Neuro Re-Ed                  SLS                  Tandem amb                  bridges         3x10 3x10 3x10 3x10 3x10 3x10 3x10   sidesteps          Pink 3x10 Pink 3x10 Pink 3x10 Pink 3x10 Pink 3x10 Pink 3x10                                                         Ther Ex                  Pt education POC, HEP, SPC use/sizing    discussed future steps  taping           Bike vs TM (strength)  5' 5' 5' 5' 5'  5' 5' 5' lvl 2 5' lvl 2 5' lvl 2 5'  5' 5'   STS repeats * 2x10                 HS stretch * 3x30" B       3x30s 3x30s 3x30s 3x30s 3x30s 3x30s 3x30s 3x30s   Sidestepping w/ TB * 20' x 2          Pink 3 laps Pink 3 laps Pink 3 laps Pink 3 laps Pink 3 laps   Leg press                  Matrix leg extension                   Bridges  3x10  3x10    3x10          Quad s   3x20s 3x20s    3x20s 3x20s 3x30s 3x30s 3x30s 3x30s 3x30s 3x30s   Clamshells   Pink 3x10  Pink 3x10     Pink 3x10 Pink 3x10 Pink 3x10 Pink 3x10 Pink 3x10 Pink 3x10 Pink 3x10 Reverse clamshells                  SLR  3x10  3x10  2x10  3x10 3x10 3x10 3x10 3x10 3x10 3x10 3x10   Ther Activity                  Step-ups                  Lateral step-ups                  Box lift w/ squat                  Heel raises  3x10 3x10 3x10 3x10   3x10 3x10 3x10 3x10 3x10 3x10 3x10 3x10   Gait Training                  W/ SPC Cane sized & advised to use LUE to hold                                   Modalities

## 2023-10-17 ENCOUNTER — HOSPITAL ENCOUNTER (OUTPATIENT)
Dept: RADIOLOGY | Facility: HOSPITAL | Age: 68
Discharge: HOME | End: 2023-10-17
Payer: COMMERCIAL

## 2023-10-17 DIAGNOSIS — M25.562 LEFT KNEE PAIN, UNSPECIFIED CHRONICITY: ICD-10-CM

## 2023-10-17 PROCEDURE — 73562 X-RAY EXAM OF KNEE 3: CPT

## 2023-10-27 ENCOUNTER — APPOINTMENT (OUTPATIENT)
Dept: RADIOLOGY | Facility: AMBULARY SURGERY CENTER | Age: 68
End: 2023-10-27
Attending: ORTHOPAEDIC SURGERY
Payer: COMMERCIAL

## 2023-10-27 ENCOUNTER — OFFICE VISIT (OUTPATIENT)
Dept: OBGYN CLINIC | Facility: CLINIC | Age: 68
End: 2023-10-27
Payer: COMMERCIAL

## 2023-10-27 VITALS — HEIGHT: 60 IN | BODY MASS INDEX: 30.43 KG/M2 | WEIGHT: 155 LBS

## 2023-10-27 DIAGNOSIS — S83.231D COMPLEX TEAR OF MEDIAL MENISCUS OF RIGHT KNEE AS CURRENT INJURY, SUBSEQUENT ENCOUNTER: ICD-10-CM

## 2023-10-27 DIAGNOSIS — M84.451D SUBCHONDRAL INSUFFICIENCY FRACTURE OF CONDYLE OF RIGHT FEMUR WITH ROUTINE HEALING, SUBSEQUENT ENCOUNTER: ICD-10-CM

## 2023-10-27 DIAGNOSIS — M54.16 LUMBAR RADICULOPATHY: ICD-10-CM

## 2023-10-27 DIAGNOSIS — M22.2X1 PATELLOFEMORAL DISORDER OF RIGHT KNEE: ICD-10-CM

## 2023-10-27 DIAGNOSIS — M84.451D SUBCHONDRAL INSUFFICIENCY FRACTURE OF CONDYLE OF RIGHT FEMUR WITH ROUTINE HEALING, SUBSEQUENT ENCOUNTER: Primary | ICD-10-CM

## 2023-10-27 PROCEDURE — 20610 DRAIN/INJ JOINT/BURSA W/O US: CPT | Performed by: ORTHOPAEDIC SURGERY

## 2023-10-27 PROCEDURE — 99214 OFFICE O/P EST MOD 30 MIN: CPT | Performed by: ORTHOPAEDIC SURGERY

## 2023-10-27 PROCEDURE — 73562 X-RAY EXAM OF KNEE 3: CPT

## 2023-10-27 RX ORDER — LIDOCAINE HYDROCHLORIDE 10 MG/ML
1 INJECTION, SOLUTION INFILTRATION; PERINEURAL
Status: COMPLETED | OUTPATIENT
Start: 2023-10-27 | End: 2023-10-27

## 2023-10-27 RX ORDER — BETAMETHASONE SODIUM PHOSPHATE AND BETAMETHASONE ACETATE 3; 3 MG/ML; MG/ML
12 INJECTION, SUSPENSION INTRA-ARTICULAR; INTRALESIONAL; INTRAMUSCULAR; SOFT TISSUE
Status: COMPLETED | OUTPATIENT
Start: 2023-10-27 | End: 2023-10-27

## 2023-10-27 RX ORDER — BUPIVACAINE HYDROCHLORIDE 2.5 MG/ML
1 INJECTION, SOLUTION INFILTRATION; PERINEURAL
Status: COMPLETED | OUTPATIENT
Start: 2023-10-27 | End: 2023-10-27

## 2023-10-27 RX ADMIN — LIDOCAINE HYDROCHLORIDE 1 ML: 10 INJECTION, SOLUTION INFILTRATION; PERINEURAL at 10:00

## 2023-10-27 RX ADMIN — BUPIVACAINE HYDROCHLORIDE 1 ML: 2.5 INJECTION, SOLUTION INFILTRATION; PERINEURAL at 10:00

## 2023-10-27 RX ADMIN — BETAMETHASONE SODIUM PHOSPHATE AND BETAMETHASONE ACETATE 12 MG: 3; 3 INJECTION, SUSPENSION INTRA-ARTICULAR; INTRALESIONAL; INTRAMUSCULAR; SOFT TISSUE at 10:00

## 2023-10-27 NOTE — PATIENT INSTRUCTIONS
PATELLOFEMORAL SYNDROME-Nieves TAPING TECHNIQUE    Search “Leukotape P tape” and “Cover roll stretch tape” on  [employer]  Leukotape P is typically 1.5in x 15 yards, Cover roll stretch tape is typically 2in x 10 yards        How to apply:  Place cover roll tape over knee cap. This protects the skin. Apply Leukotape over the cover roll tape. Use the Leukotape to pull the knee cap to the middle of the body (medial side of knee) to prevent lateral (outside) tracking of the knee cap. Wear the tape for 3 days (72 hrs) straight, then take off one day (24 hrs) off, then repeat. Visit [employer] and search “Nieves tape for the knee” to watch a video on how to apply tape. Video titled “Nieves Taping of the knee” created by [employer] recommended. What does Nieves taping technique do? Patellofemoral syndrome is when the inside quadriceps muscle, called the VMO muscle, becomes weak due to a number of factors. This causes the Patellofemoral ligament, which is the only ligament holding the patella (knee cap) in place, to become weak as well. When the ligament becomes weak, the knee cap drifts or tracks too far to the lateral side of the knee (outside knee) which causes tension on this ligament. The knee cap hits the lateral area of the femur, resulting in pain or discomfort around the front of the knee. Physical Therapy is sometimes used to strengthen the weak muscles, such as the VMO, to correct this problem. When the tape is applied correctly, it helps to realign the knee cap to the center of the knee. This helps correct for the lateral tracking of the knee cap and relieve discomfort. You can search online for exercises that can help strengthen the VMO quadriceps muscle or attend physical therapy.

## 2023-10-27 NOTE — LETTER
October 27, 2023     Patient: Vane Westbrook  YOB: 1955  Date of Visit: 10/27/2023      To Whom it May Concern:    Vane Westbrook is under my professional care. Xochitl Hills was seen in my office on 10/27/2023. [place] will continue with current work restrictions until follow up in my office. If you have any questions or concerns, please don't hesitate to call.          Sincerely,          Catie Martinez DO        CC: No Recipients

## 2023-10-27 NOTE — PROGRESS NOTES
Assessment:  1. Subchondral insufficiency fracture of condyle of right femur with routine healing, subsequent encounter  XR knee 3 vw right non injury      2. Complex tear of medial meniscus of right knee as current injury, subsequent encounter        3. Patellofemoral disorder of right knee  Ambulatory Referral to Physical Therapy      4. Lumbar radiculopathy  Ambulatory referral to Spine & Pain Management        Patient Active Problem List   Diagnosis    Syncope    Hypokalemia    Generalized weakness    Knee pain, right    Complex tear of medial meniscus of right knee as current injury    Subchondral insufficiency fracture of condyle of right femur (HCC)    Subchondral insufficiency fracture of condyle of left femur (HCC)    Patellofemoral disorder of right knee           Plan      70 y.o. female with healing subchondral insufficiency fracture and medial meniscus tear  Patient is no longer tender to palpation over insufficiency fracture  TTP over medial meniscus. CSI offered, accepted, and administered in clinic today. Procedure tolerated well, post injection protocol advised  If she does have temporary symptomatic relief from this injection we discussed arthroscopy with menisectomy. Patient also has bilateral patellofemoral irritation which we have instructed her on Myles taping and gave her a handout for Myles taping  Due to patients reported bilateral leg weakness a referral to spine and pain provided  I believe the majority of her left knee pain is due to arthritis but aggravation of the arthritis secondary to favoring of the right knee. In future we can always offer a cortisone injection for the left knee if needed. In the meantime she is going to do Myles taping to help with the knee Pain in both knees            Subjective:     Patient ID:    Chief Complaint:Brenda Begum 70 y.o. female      HPI    Patient presents for follow up evaluation of right knee.  Patient has subchondral insufficiency fracture of condyle of right femur, complex tear of medial meniscus, and patellofemoral osteoarthritis. She has been attending physical therapy and using a bone stimulator since her last visit. Today she presents with a walker for ambulation assistance. She notes improvements in her pain but not resolution. Pain is still localized medially and described as an ache. She is starting to have left knee pain. Patient states she has bilateral leg weakness. Denies numbness or paresthesias.        The following portions of the patient's history were reviewed and updated as appropriate: allergies, current medications, past family history, past social history, past surgical history and problem list.        Social History     Socioeconomic History    Marital status: /Civil Union     Spouse name: Not on file    Number of children: Not on file    Years of education: Not on file    Highest education level: Not on file   Occupational History    Not on file   Tobacco Use    Smoking status: Never    Smokeless tobacco: Never   Vaping Use    Vaping Use: Never used   Substance and Sexual Activity    Alcohol use: Never    Drug use: Never    Sexual activity: Not on file   Other Topics Concern    Not on file   Social History Narrative    Not on file     Social Determinants of Health     Financial Resource Strain: Not on file   Food Insecurity: Not on file   Transportation Needs: Not on file   Physical Activity: Not on file   Stress: Not on file   Social Connections: Not on file   Intimate Partner Violence: Not on file   Housing Stability: Not on file     Past Medical History:   Diagnosis Date    Chronic pain     L shoulder    Hyperlipidemia      Past Surgical History:   Procedure Laterality Date    HYSTERECTOMY      SMALL INTESTINE SURGERY      VARICOSE VEIN SURGERY       No Known Allergies  Current Outpatient Medications on File Prior to Visit   Medication Sig Dispense Refill    Acetaminophen Extra Strength 500 MG TABS TOME DOS TABLETAS POR V A ORAL CADA OCHO HORAS FOR 30 DAYS      alendronate (FOSAMAX) 70 mg tablet TOME JOY TABLETA POR V A ORAL EVERY WEEK EN LA NANDA CLEARY      Aspirin Low Dose 81 MG EC tablet TOME JOY TABLETA TODOS LOS D AS EN LA NANDA CLEARY      atorvastatin (LIPITOR) 10 mg tablet TOME JOY TABLETA TODOS LOS D AS      Calcium + Vitamin D3 600-10 MG-MCG TABS TOME JOY TABLETA TODOS LOS D AS      cyclobenzaprine (FLEXERIL) 5 mg tablet TAKE 1 TABLET EVERY DAY BY ORAL ROUTE AS NEEDED. meloxicam (MOBIC) 15 mg tablet Take 1 tablet (15 mg total) by mouth daily as needed for moderate pain 30 tablet 1    pravastatin (PRAVACHOL) 10 mg tablet Take 10 mg by mouth daily       No current facility-administered medications on file prior to visit. Objective:    Review of Systems   Constitutional:  Negative for chills and fever. HENT:  Negative for ear pain and sore throat. Eyes:  Negative for pain and visual disturbance. Respiratory:  Negative for cough and shortness of breath. Cardiovascular:  Negative for chest pain and palpitations. Gastrointestinal:  Negative for abdominal pain and vomiting. Genitourinary:  Negative for dysuria and hematuria. Musculoskeletal:  Negative for arthralgias and back pain. Skin:  Negative for color change and rash. Neurological:  Negative for seizures and syncope. All other systems reviewed and are negative. Right Knee Exam     Tenderness   The patient is experiencing tenderness in the medial joint line. Tests   Varus: negative Valgus: negative    Other   Erythema: absent  Sensation: normal  Pulse: present  Swelling: none  Effusion: no effusion present    Comments:  (-) bounce  NTTP distal femur             Physical Exam  Vitals and nursing note reviewed. Constitutional:       Appearance: Normal appearance. HENT:      Head: Normocephalic.       Right Ear: External ear normal.      Left Ear: External ear normal.   Eyes:      Extraocular Movements: Extraocular movements intact. Conjunctiva/sclera: Conjunctivae normal.   Cardiovascular:      Rate and Rhythm: Normal rate. Pulses: Normal pulses. Pulmonary:      Effort: Pulmonary effort is normal.      Breath sounds: Normal breath sounds. Abdominal:      General: Abdomen is flat. Musculoskeletal:         General: Normal range of motion. Cervical back: Normal range of motion and neck supple. Right knee: No effusion. Comments: As noted in HPI   Skin:     General: Skin is warm and dry. Neurological:      General: No focal deficit present. Mental Status: She is alert. Psychiatric:         Mood and Affect: Mood normal.         Behavior: Behavior normal.         Large joint arthrocentesis: R knee  Universal Protocol:  Consent: Verbal consent obtained. Risks and benefits: risks, benefits and alternatives were discussed  Consent given by: patient  Timeout called at: 10/27/2023 10:28 AM.  Patient understanding: patient states understanding of the procedure being performed  Patient identity confirmed: verbally with patient  Supporting Documentation  Indications: pain and diagnostic evaluation   Procedure Details  Location: knee - R knee  Preparation: Patient was prepped and draped in the usual sterile fashion  Needle size: 22 G  Ultrasound guidance: no  Approach: anterolateral  Medications administered: 1 mL bupivacaine 0.25 %; 1 mL lidocaine 1 %; 12 mg betamethasone acetate-betamethasone sodium phosphate 6 (3-3) mg/mL    Patient tolerance: patient tolerated the procedure well with no immediate complications  Dressing:  Sterile dressing applied             I have personally reviewed pertinent films in PACS. X-rays of the left knee show patellofemoral arthritis as well as medial tibiofemoral arthritis.     Scribe Attestation      I,:  Samantha Barakat am acting as a scribe while in the presence of the attending physician.:       I,:  Suzy Babcock DO personally performed the services described in this documentation    as scribed in my presence.:             Portions of the record may have been created with voice recognition software. Occasional wrong word or "sound a like" substitutions may have occurred due to the inherent limitations of voice recognition software. Read the chart carefully and recognize, using context, where substitutions have occurred.

## 2023-11-06 ENCOUNTER — EVALUATION (OUTPATIENT)
Dept: PHYSICAL THERAPY | Facility: REHABILITATION | Age: 68
End: 2023-11-06
Payer: COMMERCIAL

## 2023-11-06 DIAGNOSIS — M22.2X1 PATELLOFEMORAL DISORDER OF RIGHT KNEE: ICD-10-CM

## 2023-11-06 PROCEDURE — 97110 THERAPEUTIC EXERCISES: CPT | Performed by: PHYSICAL THERAPIST

## 2023-11-06 PROCEDURE — 97112 NEUROMUSCULAR REEDUCATION: CPT | Performed by: PHYSICAL THERAPIST

## 2023-11-06 PROCEDURE — 97530 THERAPEUTIC ACTIVITIES: CPT | Performed by: PHYSICAL THERAPIST

## 2023-11-06 NOTE — PROGRESS NOTES
Daily Note     Today's date: 2023  Patient name: Marianela Morales  : 1832  MRN: [id number]  Referring provider: Gretel Richards DO  Dx:   Encounter Diagnosis     ICD-10-CM    1. Patellofemoral disorder of right knee  M22.2X1 Ambulatory Referral to Physical Therapy                     Subjective: patient states she was carrying something on Saturday and now ever since she has been having increased pain in her R knee      Objective: See treatment diary below      Assessment: Tolerated treatment fair. Patient demonstrated fatigue post treatment, exhibited good technique with therapeutic exercises, and would benefit from continued PT Patient not showing any signs of damage from the weekend. Patient with all - special tests of the knee. Patient with some tenderness over patellar tendon and mild discomfort with palpation to medial joint line      Plan: Continue per plan of care.       Precautions: h/o syncope     * indicates included in HEP: STS repeats (0UE), sidestepping w/ TB, HS stretch      Manuals 7/10 7/14 7/17 7/20 7/25 8/10 9/11 9/13 9/15 9/20 9/22 9/27 9/29 10/4 10/6 11/6   PROM      done done                               Re-eval      done                                Neuro Re-Ed                   SLS                   Tandem amb                   bridges         3x10 3x10 3x10 3x10 3x10 3x10 3x10 3x10   sidesteps          Pink 3x10 Pink 3x10 Pink 3x10 Pink 3x10 Pink 3x10 Pink 3x10 Pink 3x10                                                            Ther Ex                   Pt education POC, HEP, SPC use/sizing    discussed future steps  taping            Bike vs TM (strength)  5' 5' 5' 5' 5'  5' 5' 5' lvl 2 5' lvl 2 5' lvl 2 5'  5' 5' 5'   STS repeats * 2x10                  HS stretch * 3x30" B       3x30s 3x30s 3x30s 3x30s 3x30s 3x30s 3x30s 3x30s 3x30s   Sidestepping w/ TB * 20' x 2          Pink 3 laps Pink 3 laps Pink 3 laps Pink 3 laps Pink 3 laps    Leg press                   Matrix leg extension                    Bridges  3x10  3x10    3x10           Quad s   3x20s 3x20s    3x20s 3x20s 3x30s 3x30s 3x30s 3x30s 3x30s 3x30s    Clamshells   Pink 3x10  Pink 3x10     Pink 3x10 Pink 3x10 Pink 3x10 Pink 3x10 Pink 3x10 Pink 3x10 Pink 3x10 Pink 3x10   Reverse clamshells                   SLR  3x10  3x10  2x10  3x10 3x10 3x10 3x10 3x10 3x10 3x10 3x10 3x10   Ther Activity                   Step-ups                   Lateral step-ups                   Box lift w/ squat                   Heel raises  3x10 3x10 3x10 3x10   3x10 3x10 3x10 3x10 3x10 3x10 3x10 3x10 3x10   Gait Training                   W/ SPC Cane sized & advised to use LUE to hold                                     Modalities

## 2023-11-07 ENCOUNTER — TELEPHONE (OUTPATIENT)
Age: 68
End: 2023-11-07

## 2023-11-09 ENCOUNTER — OFFICE VISIT (OUTPATIENT)
Dept: OBGYN CLINIC | Facility: CLINIC | Age: 68
End: 2023-11-09
Payer: COMMERCIAL

## 2023-11-09 ENCOUNTER — OFFICE VISIT (OUTPATIENT)
Dept: PHYSICAL THERAPY | Facility: REHABILITATION | Age: 68
End: 2023-11-09
Payer: COMMERCIAL

## 2023-11-09 ENCOUNTER — APPOINTMENT (OUTPATIENT)
Dept: LAB | Facility: AMBULARY SURGERY CENTER | Age: 68
End: 2023-11-09
Attending: ORTHOPAEDIC SURGERY
Payer: COMMERCIAL

## 2023-11-09 ENCOUNTER — APPOINTMENT (OUTPATIENT)
Dept: LAB | Facility: AMBULARY SURGERY CENTER | Age: 68
End: 2023-11-09
Payer: COMMERCIAL

## 2023-11-09 VITALS — WEIGHT: 155 LBS | HEIGHT: 60 IN | BODY MASS INDEX: 30.43 KG/M2

## 2023-11-09 DIAGNOSIS — S83.231D COMPLEX TEAR OF MEDIAL MENISCUS OF RIGHT KNEE AS CURRENT INJURY, SUBSEQUENT ENCOUNTER: ICD-10-CM

## 2023-11-09 DIAGNOSIS — S83.231D COMPLEX TEAR OF MEDIAL MENISCUS OF RIGHT KNEE AS CURRENT INJURY, SUBSEQUENT ENCOUNTER: Primary | ICD-10-CM

## 2023-11-09 DIAGNOSIS — M84.451D SUBCHONDRAL INSUFFICIENCY FRACTURE OF CONDYLE OF RIGHT FEMUR WITH ROUTINE HEALING, SUBSEQUENT ENCOUNTER: ICD-10-CM

## 2023-11-09 DIAGNOSIS — M22.2X1 PATELLOFEMORAL DISORDER OF RIGHT KNEE: Primary | ICD-10-CM

## 2023-11-09 DIAGNOSIS — M84.452A SUBCHONDRAL INSUFFICIENCY FRACTURE OF CONDYLE OF LEFT FEMUR, INITIAL ENCOUNTER (HCC): ICD-10-CM

## 2023-11-09 LAB
ANION GAP SERPL CALCULATED.3IONS-SCNC: 10 MMOL/L
BASOPHILS # BLD AUTO: 0.08 THOUSANDS/ÂΜL (ref 0–0.1)
BASOPHILS NFR BLD AUTO: 1 % (ref 0–1)
BUN SERPL-MCNC: 23 MG/DL (ref 5–25)
CALCIUM SERPL-MCNC: 9.5 MG/DL (ref 8.4–10.2)
CHLORIDE SERPL-SCNC: 103 MMOL/L (ref 96–108)
CO2 SERPL-SCNC: 28 MMOL/L (ref 21–32)
CREAT SERPL-MCNC: 0.7 MG/DL (ref 0.6–1.3)
EOSINOPHIL # BLD AUTO: 0.53 THOUSAND/ÂΜL (ref 0–0.61)
EOSINOPHIL NFR BLD AUTO: 5 % (ref 0–6)
ERYTHROCYTE [DISTWIDTH] IN BLOOD BY AUTOMATED COUNT: 13.2 % (ref 11.6–15.1)
GFR SERPL CREATININE-BSD FRML MDRD: 88 ML/MIN/1.73SQ M
GLUCOSE SERPL-MCNC: 98 MG/DL (ref 65–140)
HCT VFR BLD AUTO: 46.3 % (ref 34.8–46.1)
HGB BLD-MCNC: 15.3 G/DL (ref 11.5–15.4)
IMM GRANULOCYTES # BLD AUTO: 0.04 THOUSAND/UL (ref 0–0.2)
IMM GRANULOCYTES NFR BLD AUTO: 0 % (ref 0–2)
LYMPHOCYTES # BLD AUTO: 1.93 THOUSANDS/ÂΜL (ref 0.6–4.47)
LYMPHOCYTES NFR BLD AUTO: 20 % (ref 14–44)
MCH RBC QN AUTO: 30.8 PG (ref 26.8–34.3)
MCHC RBC AUTO-ENTMCNC: 33 G/DL (ref 31.4–37.4)
MCV RBC AUTO: 93 FL (ref 82–98)
MONOCYTES # BLD AUTO: 0.74 THOUSAND/ÂΜL (ref 0.17–1.22)
MONOCYTES NFR BLD AUTO: 8 % (ref 4–12)
NEUTROPHILS # BLD AUTO: 6.46 THOUSANDS/ÂΜL (ref 1.85–7.62)
NEUTS SEG NFR BLD AUTO: 66 % (ref 43–75)
NRBC BLD AUTO-RTO: 0 /100 WBCS
PLATELET # BLD AUTO: 421 THOUSANDS/UL (ref 149–390)
PMV BLD AUTO: 10.6 FL (ref 8.9–12.7)
POTASSIUM SERPL-SCNC: 3.9 MMOL/L (ref 3.5–5.3)
RBC # BLD AUTO: 4.96 MILLION/UL (ref 3.81–5.12)
SODIUM SERPL-SCNC: 141 MMOL/L (ref 135–147)
WBC # BLD AUTO: 9.78 THOUSAND/UL (ref 4.31–10.16)

## 2023-11-09 PROCEDURE — 85025 COMPLETE CBC W/AUTO DIFF WBC: CPT

## 2023-11-09 PROCEDURE — 97530 THERAPEUTIC ACTIVITIES: CPT | Performed by: PHYSICAL THERAPIST

## 2023-11-09 PROCEDURE — 36415 COLL VENOUS BLD VENIPUNCTURE: CPT

## 2023-11-09 PROCEDURE — 97112 NEUROMUSCULAR REEDUCATION: CPT | Performed by: PHYSICAL THERAPIST

## 2023-11-09 PROCEDURE — 97110 THERAPEUTIC EXERCISES: CPT | Performed by: PHYSICAL THERAPIST

## 2023-11-09 PROCEDURE — 80048 BASIC METABOLIC PNL TOTAL CA: CPT

## 2023-11-09 PROCEDURE — 99214 OFFICE O/P EST MOD 30 MIN: CPT | Performed by: ORTHOPAEDIC SURGERY

## 2023-11-09 RX ORDER — CHLORHEXIDINE GLUCONATE 4 G/100ML
SOLUTION TOPICAL DAILY PRN
OUTPATIENT
Start: 2023-11-09

## 2023-11-09 RX ORDER — CHLORHEXIDINE GLUCONATE ORAL RINSE 1.2 MG/ML
15 SOLUTION DENTAL ONCE
OUTPATIENT
Start: 2023-11-09 | End: 2023-11-09

## 2023-11-09 NOTE — PROGRESS NOTES
Assessment:  1. Complex tear of medial meniscus of right knee as current injury, subsequent encounter  Case request operating room: RIGHT KNEE ARTHROSCOPY PARTIAL MEDIAL MENISCECTOMY    CBC and differential    Basic metabolic panel    Ambulatory referral to Family Practice    Case request operating room: RIGHT KNEE ARTHROSCOPY PARTIAL MEDIAL MENISCECTOMY      2. Subchondral insufficiency fracture of condyle of right femur with routine healing, subsequent encounter  Ambulatory Referral to [facility] program Referral      3. Subchondral insufficiency fracture of condyle of left femur, initial encounter [facility])          Patient Active Problem List   Diagnosis   • Syncope   • Hypokalemia   • Generalized weakness   • Knee pain, right   • Complex tear of medial meniscus of right knee as current injury   • Subchondral insufficiency fracture of condyle of right femur (HCC)   • Subchondral insufficiency fracture of condyle of left femur (HCC)   • Patellofemoral disorder of right knee       Plan:    70 y.o. female  with right knee medial meniscus tear, healed subchondral insufficiency fracture of R MFC, suspected left MFC stress reaction    The patient would like to proceed with right knee arthroscopy partial medial meniscecotmy. Risks and benefits of the procedure were explained to patient in detail today questions were answered to their satisfaction. Patient gave their informed consent for above procedure. She understands she may not get full relief of pain and may not return to prior level of functioning pre-injury  Follow up 10-14 days post op  Work note provivded          The patient was seen and examined by Dr. Dhara Darling and myself. The assessment and plan were formulated by Dr. Dhara Darling and I assisted in carrying it ou    Subjective:   Patient ID: Kanu Mancini is a 70 y.o. female . HPI    Patient presents to the office for follow up of right knee pain.  Still has medial right knee pain with ambulation, has improvement over R MFC. She ambulates with use of a cane. Would like to discuss surgery for R knee.     The following portions of the patient's history were reviewed and updated as appropriate: allergies, current medications, past family history, past social history, past surgical history and problem list.    Social History     Socioeconomic History   • Marital status: /Civil Union     Spouse name: Not on file   • Number of children: Not on file   • Years of education: Not on file   • Highest education level: Not on file   Occupational History   • Not on file   Tobacco Use   • Smoking status: Never   • Smokeless tobacco: Never   Vaping Use   • Vaping Use: Never used   Substance and Sexual Activity   • Alcohol use: Never   • Drug use: Never   • Sexual activity: Not on file   Other Topics Concern   • Not on file   Social History Narrative   • Not on file     Social Determinants of Health     Financial Resource Strain: Not on file   Food Insecurity: Not on file   Transportation Needs: Not on file   Physical Activity: Not on file   Stress: Not on file   Social Connections: Not on file   Intimate Partner Violence: Not on file   Housing Stability: Not on file     Past Medical History:   Diagnosis Date   • Chronic pain     L shoulder   • Hyperlipidemia      Past Surgical History:   Procedure Laterality Date   • HYSTERECTOMY     • SMALL INTESTINE SURGERY     • VARICOSE VEIN SURGERY       No Known Allergies  Current Outpatient Medications on File Prior to Visit   Medication Sig Dispense Refill   • Acetaminophen Extra Strength 500 MG TABS TOME DOS TABLETAS POR V A ORAL CADA OCHO HORAS FOR 30 DAYS     • alendronate (FOSAMAX) 70 mg tablet TOME JOY TABLETA POR V A ORAL EVERY WEEK EN LA MA MADIHA     • Aspirin Low Dose 81 MG EC tablet TOME JOY TABLETA TODOS LOS D AS EN LA MA MADIHA     • atorvastatin (LIPITOR) 10 mg tablet TOME JOY TABLETA TODOS LOS D AS     • Calcium + Vitamin D3 600-10 MG-MCG TABS TOME JOY TABLETA TODOS LOS D AS     • cyclobenzaprine (FLEXERIL) 5 mg tablet TAKE 1 TABLET EVERY DAY BY ORAL ROUTE AS NEEDED. • meloxicam (MOBIC) 15 mg tablet Take 1 tablet (15 mg total) by mouth daily as needed for moderate pain 30 tablet 1   • pravastatin (PRAVACHOL) 10 mg tablet Take 10 mg by mouth daily       No current facility-administered medications on file prior to visit. Review of Systems  See HPi    Objective: There were no vitals filed for this visit. Physical Exam  Musculoskeletal:      Right knee: No effusion. Instability Tests: Medial Temi test positive. Lateral Temi test negative. Right Knee Exam     Tenderness   The patient is experiencing tenderness in the medial joint line. Range of Motion   The patient has normal right knee ROM. Tests   Temi:  Medial - positive Lateral - negative  Varus: negative Valgus: negative    Other   Erythema: absent  Scars: absent  Sensation: normal  Pulse: present  Swelling: none  Effusion: no effusion present      Left Knee Exam     Tenderness   The patient is experiencing tenderness in the medial joint line. I have personally reviewed pertinent films in PACS. Procedures  No Procedures performed today         Scribe Attestation    I,:  Alexy Cm PA-C am acting as a scribe while in the presence of the attending physician.:       I,:  Gene Carbajal DO personally performed the services described in this documentation    as scribed in my presence.:              Portions of the record may have been created with voice recognition software. Occasional wrong word or "sound a like" substitutions may have occurred due to the inherent limitations of voice recognition software. Read the chart carefully and recognize, using context, where substitutions have occurred.

## 2023-11-09 NOTE — LETTER
November 9, 2023     Patient: Claudetta Showers  YOB: 1955  Date of Visit: 11/9/2023      To Whom it May Concern:    Claudetta Showers is under my professional care. Audelia Onofre was seen in my office on 11/9/2023. Audelia Onofre may return to work on 11/10/23 with limitations . She may perform sedentary work only. If employer cannot accommodate these restrictions then employer will not be able to return employee to work until restrictions are modified or removed . She is planning to have surgery on the right knee in the next 1-2 months. Would expect she can return to regular unrestricted work duty 4-6 weeks after her surgical date which is not selected at the time of this note being written. If you have any questions or concerns, please don't hesitate to call.          Sincerely,          Berta Padilla DO        CC: No Recipients

## 2023-11-09 NOTE — H&P (VIEW-ONLY)
Assessment:  1. Complex tear of medial meniscus of right knee as current injury, subsequent encounter  Case request operating room: RIGHT KNEE ARTHROSCOPY PARTIAL MEDIAL MENISCECTOMY    CBC and differential    Basic metabolic panel    Ambulatory referral to Family Practice    Case request operating room: RIGHT KNEE ARTHROSCOPY PARTIAL MEDIAL MENISCECTOMY      2. Subchondral insufficiency fracture of condyle of right femur with routine healing, subsequent encounter  Ambulatory Referral to [facility] program Referral      3. Subchondral insufficiency fracture of condyle of left femur, initial encounter [facility])          Patient Active Problem List   Diagnosis   • Syncope   • Hypokalemia   • Generalized weakness   • Knee pain, right   • Complex tear of medial meniscus of right knee as current injury   • Subchondral insufficiency fracture of condyle of right femur (HCC)   • Subchondral insufficiency fracture of condyle of left femur (HCC)   • Patellofemoral disorder of right knee       Plan:    70 y.o. female  with right knee medial meniscus tear, healed subchondral insufficiency fracture of R MFC, suspected left MFC stress reaction    The patient would like to proceed with right knee arthroscopy partial medial meniscecotmy. Risks and benefits of the procedure were explained to patient in detail today questions were answered to their satisfaction. Patient gave their informed consent for above procedure. She understands she may not get full relief of pain and may not return to prior level of functioning pre-injury  Follow up 10-14 days post op  Work note provivded          The patient was seen and examined by Dr. Yanely Menjivar and myself. The assessment and plan were formulated by Dr. Yanely Menjivar and I assisted in carrying it ou    Subjective:   Patient ID: Manfred Hoffman is a 70 y.o. female . HPI    Patient presents to the office for follow up of right knee pain.  Still has medial right knee pain with ambulation, has improvement over R MFC. She ambulates with use of a cane. Would like to discuss surgery for R knee.     The following portions of the patient's history were reviewed and updated as appropriate: allergies, current medications, past family history, past social history, past surgical history and problem list.    Social History     Socioeconomic History   • Marital status: /Civil Union     Spouse name: Not on file   • Number of children: Not on file   • Years of education: Not on file   • Highest education level: Not on file   Occupational History   • Not on file   Tobacco Use   • Smoking status: Never   • Smokeless tobacco: Never   Vaping Use   • Vaping Use: Never used   Substance and Sexual Activity   • Alcohol use: Never   • Drug use: Never   • Sexual activity: Not on file   Other Topics Concern   • Not on file   Social History Narrative   • Not on file     Social Determinants of Health     Financial Resource Strain: Not on file   Food Insecurity: Not on file   Transportation Needs: Not on file   Physical Activity: Not on file   Stress: Not on file   Social Connections: Not on file   Intimate Partner Violence: Not on file   Housing Stability: Not on file     Past Medical History:   Diagnosis Date   • Chronic pain     L shoulder   • Hyperlipidemia      Past Surgical History:   Procedure Laterality Date   • HYSTERECTOMY     • SMALL INTESTINE SURGERY     • VARICOSE VEIN SURGERY       No Known Allergies  Current Outpatient Medications on File Prior to Visit   Medication Sig Dispense Refill   • Acetaminophen Extra Strength 500 MG TABS TOME DOS TABLETAS POR V A ORAL CADA OCHO HORAS FOR 30 DAYS     • alendronate (FOSAMAX) 70 mg tablet TOME JOY TABLETA POR V A ORAL EVERY WEEK EN LA MA MADIHA     • Aspirin Low Dose 81 MG EC tablet TOME JOY TABLETA TODOS LOS D AS EN LA MA MADIHA     • atorvastatin (LIPITOR) 10 mg tablet TOME JOY TABLETA TODOS LOS D AS     • Calcium + Vitamin D3 600-10 MG-MCG TABS TOME JOY TABLETA TODOS LOS D AS     • cyclobenzaprine (FLEXERIL) 5 mg tablet TAKE 1 TABLET EVERY DAY BY ORAL ROUTE AS NEEDED. • meloxicam (MOBIC) 15 mg tablet Take 1 tablet (15 mg total) by mouth daily as needed for moderate pain 30 tablet 1   • pravastatin (PRAVACHOL) 10 mg tablet Take 10 mg by mouth daily       No current facility-administered medications on file prior to visit. Review of Systems  See HPi    Objective: There were no vitals filed for this visit. Physical Exam  Musculoskeletal:      Right knee: No effusion. Instability Tests: Medial Temi test positive. Lateral Temi test negative. Right Knee Exam     Tenderness   The patient is experiencing tenderness in the medial joint line. Range of Motion   The patient has normal right knee ROM. Tests   Temi:  Medial - positive Lateral - negative  Varus: negative Valgus: negative    Other   Erythema: absent  Scars: absent  Sensation: normal  Pulse: present  Swelling: none  Effusion: no effusion present      Left Knee Exam     Tenderness   The patient is experiencing tenderness in the medial joint line. I have personally reviewed pertinent films in PACS. Procedures  No Procedures performed today         Scribe Attestation    I,:  Lani Negron PA-C am acting as a scribe while in the presence of the attending physician.:       I,:  Delfino Leung DO personally performed the services described in this documentation    as scribed in my presence.:              Portions of the record may have been created with voice recognition software. Occasional wrong word or "sound a like" substitutions may have occurred due to the inherent limitations of voice recognition software. Read the chart carefully and recognize, using context, where substitutions have occurred.

## 2023-11-09 NOTE — PROGRESS NOTES
Daily Note     Today's date: 2023  Patient name: Manfred Hoffman  : [birth year]  MRN: [id number]  Referring provider: Darcie Lovett DO  Dx:   Encounter Diagnosis     ICD-10-CM    1. Patellofemoral disorder of right knee  M22.2X1                      Subjective: patient states she is feeling better than she was earlier in the week but she is still having issues walking more than 5 or 10 minutes and whenever she holds anything over 5-10 lbs      Objective: See treatment diary below      Assessment: Tolerated treatment fair. Patient demonstrated fatigue post treatment, exhibited good technique with therapeutic exercises, and would benefit from continued PT Patient with improving symptoms since Monday. Patient continues to have joint line tenderness and muscle weakness around the knee      Plan: Continue per plan of care.       Precautions: h/o syncope     * indicates included in HEP: STS repeats (0UE), sidestepping w/ TB, HS stretch      Manuals 7/10 7/14 7/17 7/20 7/25 8/10 9/11 9/13 9/15 9/20 9/22 9/27 9/29 10/4 10/6 11/6 11/9   PROM      done done                                 Re-eval      done                                  Neuro Re-Ed                    SLS                    Tandem amb                    bridges         3x10 3x10 3x10 3x10 3x10 3x10 3x10 3x10 3x10   sidesteps          Pink 3x10 Pink 3x10 Pink 3x10 Pink 3x10 Pink 3x10 Pink 3x10 Pink 3x10 Pink 3x10                                                               Ther Ex                    Pt education POC, HEP, SPC use/sizing    discussed future steps  taping             Bike vs TM (strength)  5' 5' 5' 5' 5'  5' 5' 5' lvl 2 5' lvl 2 5' lvl 2 5'  5' 5' 5' 5'   STS repeats * 2x10                   HS stretch * 3x30" B       3x30s 3x30s 3x30s 3x30s 3x30s 3x30s 3x30s 3x30s 3x30s 3x30s   Sidestepping w/ TB * 20' x 2          Pink 3 laps Pink 3 laps Pink 3 laps Pink 3 laps Pink 3 laps     Leg press                    Matrix leg extension Bridges  3x10  3x10    3x10            Quad s   3x20s 3x20s    3x20s 3x20s 3x30s 3x30s 3x30s 3x30s 3x30s 3x30s     Clamshells   Pink 3x10  Pink 3x10     Pink 3x10 Pink 3x10 Pink 3x10 Pink 3x10 Pink 3x10 Pink 3x10 Pink 3x10 Pink 3x10 Pink 3x10   Reverse clamshells                    SLR  3x10  3x10  2x10  3x10 3x10 3x10 3x10 3x10 3x10 3x10 3x10 3x10 3x10   Ther Activity                    Step-ups                    Lateral step-ups                    Box lift w/ squat                    Heel raises  3x10 3x10 3x10 3x10   3x10 3x10 3x10 3x10 3x10 3x10 3x10 3x10 3x10 3x10   Gait Training                    W/ SPC Cane sized & advised to use LUE to hold                                       Modalities

## 2023-11-10 ENCOUNTER — ANESTHESIA (OUTPATIENT)
Dept: ANESTHESIOLOGY | Facility: HOSPITAL | Age: 68
End: 2023-11-10

## 2023-11-10 ENCOUNTER — ANESTHESIA EVENT (OUTPATIENT)
Dept: ANESTHESIOLOGY | Facility: HOSPITAL | Age: 68
End: 2023-11-10

## 2023-11-17 RX ORDER — ALENDRONATE SODIUM 70 MG/1
70 TABLET ORAL
COMMUNITY

## 2023-11-17 NOTE — PRE-PROCEDURE INSTRUCTIONS
Pre-Surgery Instructions:   Medication Instructions    alendronate (FOSAMAX) 70 mg tablet Hold day of surgery. Aspirin Low Dose 81 MG EC tablet Stop taking 7 days prior to surgery. atorvastatin (LIPITOR) 10 mg tablet Take night before surgery    Calcium + Vitamin D3 600-10 MG-MCG TABS Hold day of surgery. meloxicam (MOBIC) 15 mg tablet Stop taking 7 days prior to surgery. Medication instructions for day surgery reviewed. Please use only a sip of water to take your instructed medications. Avoid all over the counter vitamins, supplements and NSAIDS for one week prior to surgery per anesthesia guidelines. Tylenol is ok to take as needed. You will receive a call one business day prior to surgery with an arrival time and hospital directions. If your surgery is scheduled on a Monday, the hospital will be calling you on the Friday prior to your surgery. If you have not heard from anyone by 8pm, please call the hospital supervisor through the hospital  at [phone]. Ricarda Arthur [phone]). Do not eat or drink anything after midnight the night before your surgery, including candy, mints, lifesavers, or chewing gum. Do not drink alcohol 24hrs before your surgery. Try not to smoke at least 24hrs before your surgery. Follow the pre surgery showering instructions as listed in the [facility] Surgical Experience Booklet” or otherwise provided by your surgeon's office. Do not use a blade to shave the surgical area 1 week before surgery. It is okay to use a clean electric clippers up to 24 hours before surgery. Do not apply any lotions, creams, including makeup, cologne, deodorant, or perfumes after showering on the day of your surgery. Do not use dry shampoo, hair spray, hair gel, or any type of hair products. No contact lenses, eye make-up, or artificial eyelashes. Remove nail polish, including gel polish, and any artificial, gel, or acrylic nails if possible.  Remove all jewelry including rings and body piercing jewelry. Wear causal clothing that is easy to take on and off. Consider your type of surgery. Keep any valuables, jewelry, piercings at home. Please bring any specially ordered equipment (sling, braces) if indicated. Arrange for a responsible person to drive you to and from the hospital on the day of your surgery. Visitor Guidelines discussed. Call the surgeon's office with any new illnesses, exposures, or additional questions prior to surgery. Please reference your [facility] Surgical Experience Booklet” for additional information to prepare for your upcoming surgery. [employer]  was used. Pt instructed to stop nsaids and supplements one week prior to surgery. Pt verbalized understanding of shower and med instructions.

## 2023-11-20 ENCOUNTER — ANESTHESIA EVENT (OUTPATIENT)
Dept: PERIOP | Facility: AMBULARY SURGERY CENTER | Age: 68
End: 2023-11-20
Payer: COMMERCIAL

## 2023-11-21 ENCOUNTER — HOSPITAL ENCOUNTER (OUTPATIENT)
Facility: AMBULARY SURGERY CENTER | Age: 68
Setting detail: OUTPATIENT SURGERY
Discharge: HOME | End: 2023-11-21
Attending: ORTHOPAEDIC SURGERY | Admitting: ORTHOPAEDIC SURGERY
Payer: COMMERCIAL

## 2023-11-21 ENCOUNTER — ANESTHESIA (OUTPATIENT)
Dept: PERIOP | Facility: AMBULARY SURGERY CENTER | Age: 68
End: 2023-11-21
Payer: COMMERCIAL

## 2023-11-21 VITALS
TEMPERATURE: 98 F | OXYGEN SATURATION: 96 % | HEIGHT: 60 IN | DIASTOLIC BLOOD PRESSURE: 82 MMHG | SYSTOLIC BLOOD PRESSURE: 130 MMHG | RESPIRATION RATE: 16 BRPM | WEIGHT: 161 LBS | HEART RATE: 65 BPM | BODY MASS INDEX: 31.61 KG/M2

## 2023-11-21 DIAGNOSIS — S83.231A COMPLEX TEAR OF MEDIAL MENISCUS OF RIGHT KNEE AS CURRENT INJURY, INITIAL ENCOUNTER: Primary | ICD-10-CM

## 2023-11-21 PROCEDURE — 29881 ARTHRS KNE SRG MNISECTMY M/L: CPT | Performed by: ORTHOPAEDIC SURGERY

## 2023-11-21 PROCEDURE — 29881 ARTHRS KNE SRG MNISECTMY M/L: CPT | Performed by: PHYSICIAN ASSISTANT

## 2023-11-21 RX ORDER — SODIUM CHLORIDE, SODIUM LACTATE, POTASSIUM CHLORIDE, CALCIUM CHLORIDE 600; 310; 30; 20 MG/100ML; MG/100ML; MG/100ML; MG/100ML
INJECTION, SOLUTION INTRAVENOUS CONTINUOUS PRN
Status: DISCONTINUED | OUTPATIENT
Start: 2023-11-21 | End: 2023-11-21

## 2023-11-21 RX ORDER — CHLORHEXIDINE GLUCONATE 4 G/100ML
SOLUTION TOPICAL DAILY PRN
Status: DISCONTINUED | OUTPATIENT
Start: 2023-11-21 | End: 2023-11-21 | Stop reason: HOSPADM

## 2023-11-21 RX ORDER — OXYCODONE HYDROCHLORIDE 5 MG/1
10 TABLET ORAL EVERY 4 HOURS PRN
Status: DISCONTINUED | OUTPATIENT
Start: 2023-11-21 | End: 2023-11-21 | Stop reason: HOSPADM

## 2023-11-21 RX ORDER — PROPOFOL 10 MG/ML
INJECTION, EMULSION INTRAVENOUS CONTINUOUS PRN
Status: DISCONTINUED | OUTPATIENT
Start: 2023-11-21 | End: 2023-11-21

## 2023-11-21 RX ORDER — CEFAZOLIN SODIUM 1 G/50ML
1000 SOLUTION INTRAVENOUS ONCE
Status: COMPLETED | OUTPATIENT
Start: 2023-11-21 | End: 2023-11-21

## 2023-11-21 RX ORDER — OXYCODONE HYDROCHLORIDE 5 MG/1
5 TABLET ORAL EVERY 4 HOURS PRN
Status: DISCONTINUED | OUTPATIENT
Start: 2023-11-21 | End: 2023-11-21 | Stop reason: HOSPADM

## 2023-11-21 RX ORDER — OXYCODONE HYDROCHLORIDE AND ACETAMINOPHEN 5; 325 MG/1; MG/1
1 TABLET ORAL EVERY 6 HOURS PRN
Qty: 7 TABLET | Refills: 0 | Status: SHIPPED | OUTPATIENT
Start: 2023-11-21

## 2023-11-21 RX ORDER — FENTANYL CITRATE 50 UG/ML
INJECTION, SOLUTION INTRAMUSCULAR; INTRAVENOUS AS NEEDED
Status: DISCONTINUED | OUTPATIENT
Start: 2023-11-21 | End: 2023-11-21

## 2023-11-21 RX ORDER — ONDANSETRON 2 MG/ML
4 INJECTION INTRAMUSCULAR; INTRAVENOUS ONCE AS NEEDED
Status: DISCONTINUED | OUTPATIENT
Start: 2023-11-21 | End: 2023-11-21 | Stop reason: HOSPADM

## 2023-11-21 RX ORDER — PHENYLEPHRINE HCL IN 0.9% NACL 1 MG/10 ML
SYRINGE (ML) INTRAVENOUS AS NEEDED
Status: DISCONTINUED | OUTPATIENT
Start: 2023-11-21 | End: 2023-11-21

## 2023-11-21 RX ORDER — LIDOCAINE HYDROCHLORIDE AND EPINEPHRINE 10; 10 MG/ML; UG/ML
INJECTION, SOLUTION INFILTRATION; PERINEURAL AS NEEDED
Status: DISCONTINUED | OUTPATIENT
Start: 2023-11-21 | End: 2023-11-21 | Stop reason: HOSPADM

## 2023-11-21 RX ORDER — MIDAZOLAM HYDROCHLORIDE 2 MG/2ML
INJECTION, SOLUTION INTRAMUSCULAR; INTRAVENOUS AS NEEDED
Status: DISCONTINUED | OUTPATIENT
Start: 2023-11-21 | End: 2023-11-21

## 2023-11-21 RX ORDER — MORPHINE SULFATE 4 MG/ML
2 INJECTION, SOLUTION INTRAMUSCULAR; INTRAVENOUS EVERY 4 HOURS PRN
Status: DISCONTINUED | OUTPATIENT
Start: 2023-11-21 | End: 2023-11-21 | Stop reason: HOSPADM

## 2023-11-21 RX ORDER — PROPOFOL 10 MG/ML
INJECTION, EMULSION INTRAVENOUS AS NEEDED
Status: DISCONTINUED | OUTPATIENT
Start: 2023-11-21 | End: 2023-11-21

## 2023-11-21 RX ORDER — SODIUM CHLORIDE, SODIUM LACTATE, POTASSIUM CHLORIDE, CALCIUM CHLORIDE 600; 310; 30; 20 MG/100ML; MG/100ML; MG/100ML; MG/100ML
50 INJECTION, SOLUTION INTRAVENOUS CONTINUOUS
Status: DISCONTINUED | OUTPATIENT
Start: 2023-11-21 | End: 2023-11-21 | Stop reason: HOSPADM

## 2023-11-21 RX ORDER — ONDANSETRON 2 MG/ML
4 INJECTION INTRAMUSCULAR; INTRAVENOUS EVERY 6 HOURS PRN
Status: DISCONTINUED | OUTPATIENT
Start: 2023-11-21 | End: 2023-11-21 | Stop reason: HOSPADM

## 2023-11-21 RX ORDER — FENTANYL CITRATE/PF 50 MCG/ML
25 SYRINGE (ML) INJECTION
Status: DISCONTINUED | OUTPATIENT
Start: 2023-11-21 | End: 2023-11-21 | Stop reason: HOSPADM

## 2023-11-21 RX ORDER — CHLORHEXIDINE GLUCONATE ORAL RINSE 1.2 MG/ML
15 SOLUTION DENTAL ONCE
Status: DISCONTINUED | OUTPATIENT
Start: 2023-11-21 | End: 2023-11-21 | Stop reason: HOSPADM

## 2023-11-21 RX ORDER — ONDANSETRON 2 MG/ML
INJECTION INTRAMUSCULAR; INTRAVENOUS AS NEEDED
Status: DISCONTINUED | OUTPATIENT
Start: 2023-11-21 | End: 2023-11-21

## 2023-11-21 RX ORDER — LIDOCAINE HYDROCHLORIDE 10 MG/ML
INJECTION, SOLUTION EPIDURAL; INFILTRATION; INTRACAUDAL; PERINEURAL AS NEEDED
Status: DISCONTINUED | OUTPATIENT
Start: 2023-11-21 | End: 2023-11-21

## 2023-11-21 RX ORDER — DEXAMETHASONE SODIUM PHOSPHATE 10 MG/ML
INJECTION, SOLUTION INTRAMUSCULAR; INTRAVENOUS AS NEEDED
Status: DISCONTINUED | OUTPATIENT
Start: 2023-11-21 | End: 2023-11-21

## 2023-11-21 RX ADMIN — LIDOCAINE HYDROCHLORIDE 50 MG: 10 INJECTION, SOLUTION EPIDURAL; INFILTRATION; INTRACAUDAL; PERINEURAL at 12:30

## 2023-11-21 RX ADMIN — MIDAZOLAM 2 MG: 1 INJECTION INTRAMUSCULAR; INTRAVENOUS at 12:26

## 2023-11-21 RX ADMIN — OXYCODONE HYDROCHLORIDE 5 MG: 5 TABLET ORAL at 14:06

## 2023-11-21 RX ADMIN — CEFAZOLIN SODIUM 1000 MG: 1 SOLUTION INTRAVENOUS at 12:08

## 2023-11-21 RX ADMIN — ONDANSETRON 4 MG: 2 INJECTION INTRAMUSCULAR; INTRAVENOUS at 12:33

## 2023-11-21 RX ADMIN — DEXAMETHASONE SODIUM PHOSPHATE 5 MG: 10 INJECTION, SOLUTION INTRAMUSCULAR; INTRAVENOUS at 12:33

## 2023-11-21 RX ADMIN — FENTANYL CITRATE 50 MCG: 50 INJECTION INTRAMUSCULAR; INTRAVENOUS at 12:49

## 2023-11-21 RX ADMIN — SODIUM CHLORIDE, SODIUM LACTATE, POTASSIUM CHLORIDE, AND CALCIUM CHLORIDE: .6; .31; .03; .02 INJECTION, SOLUTION INTRAVENOUS at 12:23

## 2023-11-21 RX ADMIN — SODIUM CHLORIDE, SODIUM LACTATE, POTASSIUM CHLORIDE, AND CALCIUM CHLORIDE: .6; .31; .03; .02 INJECTION, SOLUTION INTRAVENOUS at 12:56

## 2023-11-21 RX ADMIN — Medication 100 MCG: at 12:49

## 2023-11-21 RX ADMIN — PROPOFOL 100 MG: 10 INJECTION, EMULSION INTRAVENOUS at 12:30

## 2023-11-21 RX ADMIN — PROPOFOL 50 MCG/KG/MIN: 10 INJECTION, EMULSION INTRAVENOUS at 12:33

## 2023-11-21 RX ADMIN — FENTANYL CITRATE 50 MCG: 50 INJECTION INTRAMUSCULAR; INTRAVENOUS at 12:30

## 2023-11-21 NOTE — ANESTHESIA POSTPROCEDURE EVALUATION
Post-Op Assessment Note    CV Status:  Stable    Pain management: adequate       Mental Status:  Sleepy and lethargic   Hydration Status:  Stable   PONV Controlled:  None   Airway Patency:  Patent     Post Op Vitals Reviewed: Yes    No anethesia notable event occurred.     Staff: CRNA               BP   101/57   Temp      Pulse  67   Resp      SpO2   93

## 2023-11-21 NOTE — ANESTHESIA PREPROCEDURE EVALUATION
Procedure:  KNEE ARTHROSCOPY PARTIAL MEDIAL MENISCECTOMY (Right: Knee)    Relevant Problems   No relevant active problems        Physical Exam    Airway    Mallampati score: II  TM Distance: >3 FB  Neck ROM: full     Dental   No notable dental hx     Cardiovascular  Rhythm: regular, No peripheral edema, No weak pulses    Pulmonary   No stridor    Other Findings  post-pubertal.      Anesthesia Plan  ASA Score- 2     Anesthesia Type- general with ASA Monitors. Additional Monitors:     Airway Plan: LMA. Plan Factors-    Chart reviewed. Existing labs reviewed. Patient summary reviewed. Induction- intravenous. Postoperative Plan- Plan for postoperative opioid use. Planned trial extubation    Informed Consent- Anesthetic plan and risks discussed with patient. I personally reviewed this patient with the CRNA. Discussed and agreed on the Anesthesia Plan with the CRNA. Pranav Henry

## 2023-11-21 NOTE — ANESTHESIA PROCEDURE NOTES
Anesthesia Notable Event    Date/Time: 11/21/2023 2:06 PM    Performed by: Derrek Mcbride MD  Authorized by: Derrek Mcbride MD

## 2023-11-21 NOTE — OP NOTE
OPERATIVE REPORT  PATIENT NAME: Nick Garcia    :  1955  MRN: [id number]  Pt Location: [facility] OR [facility]OM 06    SURGERY DATE: 2023    Surgeon(s) and Role:     * Mindi Weaver DO - Primary     * Wellington Forbes PA-C - Assisting utilized on the case for assistance with prepping draping positioning and closure of wound and dressing application    Preop Diagnosis:  Complex tear of medial meniscus of right knee as current injury, subsequent encounter [S83.231D]    Post-Op Diagnosis Codes:     * Complex tear of medial meniscus of right knee as current injury, subsequent encounter [S83.231D]    Procedure(s):  Right - KNEE ARTHROSCOPY PARTIAL MEDIAL MENISCECTOMY    Specimen(s):  * No specimens in log *    Estimated Blood Loss:   Minimal    Drains:  * No LDAs found *    Anesthesia Type:   General    Operative Indications:  Complex tear of medial meniscus of right knee as current injury, subsequent encounter [S83.231D]      Operative Findings:  Complex medial meniscus tear with delamination. There is also loose bodies floating around in the gutters of both medial lateral gutter. There was grade 2 changes in the patellofemoral joint there is some mild fraying of the lateral meniscus    Complications:   None    Procedure and Technique:  Patient was seen and examined in the preoperative area. The right lower extremity was marked, the consent an H&P had been reviewed. The patient was brought back to the operative suite. The patient was intubated sedated. The right lower extremity was prepped and draped in a sterile fashion. After proper timeout commenced and identified the right lower extremity as the operative site. Injection of quarter percent Marcaine with epinephrine was injected in both medial lateral portal site. Incision over the lateral portal was made with eleven blade. We performed a diagnostic arthroscopy.   We used the arthroscope and spinal needle to located our medial portal, and made incision with 11 blade and dilated with hemostat. We completed our diagnostic arthroscopy. We found Complex medial meniscus tear with delamination. There is also loose bodies floating around in the gutters of both medial lateral gutter. There was grade 2 changes in the patellofemoral joint there is some mild fraying of the lateral meniscus. We debrided our meniscus back to a stable by using electro cautery device, shaver, and biters. We copiously irrigated the wound. We closed the incision with 3-0 nylon. Xeroform was applied to the incisions. 4 x 4's ABDs , web roll and an Ace wrap were applied to right lower extremity. The patient was taken back to PACU after anesthesia was reversed. I was present for the entire procedure. and A qualified resident physician was not available.     Patient Disposition:  PACU         SIGNATURE: Darcie Lovett DO  DATE: November 21, 2023  TIME: 1:00 PM

## 2023-12-06 ENCOUNTER — OFFICE VISIT (OUTPATIENT)
Dept: OBGYN CLINIC | Facility: CLINIC | Age: 68
End: 2023-12-06

## 2023-12-06 VITALS
HEIGHT: 60 IN | WEIGHT: 161 LBS | SYSTOLIC BLOOD PRESSURE: 125 MMHG | DIASTOLIC BLOOD PRESSURE: 84 MMHG | HEART RATE: 71 BPM | BODY MASS INDEX: 31.61 KG/M2

## 2023-12-06 DIAGNOSIS — Z98.890 S/P ARTHROSCOPY OF KNEE: Primary | ICD-10-CM

## 2023-12-06 DIAGNOSIS — M84.451D SUBCHONDRAL INSUFFICIENCY FRACTURE OF CONDYLE OF RIGHT FEMUR WITH ROUTINE HEALING, SUBSEQUENT ENCOUNTER: ICD-10-CM

## 2023-12-06 DIAGNOSIS — S83.231D COMPLEX TEAR OF MEDIAL MENISCUS OF RIGHT KNEE AS CURRENT INJURY, SUBSEQUENT ENCOUNTER: ICD-10-CM

## 2023-12-06 PROCEDURE — 99024 POSTOP FOLLOW-UP VISIT: CPT | Performed by: ORTHOPAEDIC SURGERY

## 2023-12-06 RX ORDER — IBUPROFEN 200 MG
TABLET ORAL EVERY 6 HOURS PRN
COMMUNITY

## 2023-12-06 NOTE — PROGRESS NOTES
Assessment:  1. S/P arthroscopy of knee        2. Subchondral insufficiency fracture of condyle of right femur with routine healing, subsequent encounter  Ambulatory Referral to [facility] program Referral      3. Complex tear of medial meniscus of right knee as current injury, subsequent encounter          Patient Active Problem List   Diagnosis    Syncope    Hypokalemia    Generalized weakness    Knee pain, right    Complex tear of medial meniscus of right knee as current injury    Subchondral insufficiency fracture of condyle of right femur (HCC)    Subchondral insufficiency fracture of condyle of left femur (HCC)    Patellofemoral disorder of right knee           Plan      70 y.o. female 2 weeks s/p right knee arthroscopy with medial meniscectomy   Sutures removed today. Incisions are clean, dry and intact. No sign of infection. Patient may get wet, pat dry, do not soak, do not scrub. Clinical images reviewed with patient. Home exercise program discussed with patient. Follow up in 2 weeks, repeat evaluation. Subjective:     Patient ID:    Chief Complaint:Brenda Matson Lines 70 y.o. female      HPI    Patient presents 2 weeks status post right knee arthroscopy and partial medial meniscectomy. Patient states for the first few days post operatively she felt great but has had a recent return of her right knee pain. Over all she is doing well, she is still using a cane for ambulation assistance.        The following portions of the patient's history were reviewed and updated as appropriate: allergies, current medications, past family history, past social history, past surgical history and problem list.        Social History     Socioeconomic History    Marital status: /Civil Union     Spouse name: Not on file    Number of children: Not on file    Years of education: Not on file    Highest education level: Not on file   Occupational History    Not on file   Tobacco Use    Smoking status: Never Smokeless tobacco: Never   Vaping Use    Vaping Use: Never used   Substance and Sexual Activity    Alcohol use: Never    Drug use: Never    Sexual activity: Not on file   Other Topics Concern    Not on file   Social History Narrative    Not on file     Social Determinants of Health     Financial Resource Strain: Not on file   Food Insecurity: Not on file   Transportation Needs: Not on file   Physical Activity: Not on file   Stress: Not on file   Social Connections: Not on file   Intimate Partner Violence: Not on file   Housing Stability: Not on file     Past Medical History:   Diagnosis Date    Chronic pain     L shoulder    Hyperlipidemia      Past Surgical History:   Procedure Laterality Date    HYSTERECTOMY      ND ARTHRS KNE SURG W/MENISCECTOMY MED/LAT W/SHVG Right 11/21/2023    Procedure: KNEE ARTHROSCOPY PARTIAL MEDIAL MENISCECTOMY;  Surgeon: Rose Cisneros DO;  Location: [facility] [place] [facility];  Service: Orthopedics    SMALL INTESTINE SURGERY      VARICOSE VEIN SURGERY       No Known Allergies  Current Outpatient Medications on File Prior to Visit   Medication Sig Dispense Refill    alendronate (FOSAMAX) 70 mg tablet Take 70 mg by mouth every 7 days      Aspirin Low Dose 81 MG EC tablet TOME JOY TABLETA TODOS LOS D AS EN LA MA MADIHA      atorvastatin (LIPITOR) 10 mg tablet TOME JOY TABLETA TODOS LOS D AS      Calcium + Vitamin D3 600-10 MG-MCG TABS TOME JOY TABLETA TODOS LOS D AS      ibuprofen (MOTRIN) 200 mg tablet Take by mouth every 6 (six) hours as needed for mild pain      meloxicam (MOBIC) 15 mg tablet Take 1 tablet (15 mg total) by mouth daily as needed for moderate pain 30 tablet 1    oxyCODONE-acetaminophen (PERCOCET) 5-325 mg per tablet Take 1 tablet by mouth every 6 (six) hours as needed for severe pain for up to 7 doses Max Daily Amount: 4 tablets (Patient not taking: Reported on 12/6/2023) 7 tablet 0     No current facility-administered medications on file prior to visit. Objective:    Review of Systems   Constitutional:  Negative for chills and fever. HENT:  Negative for ear pain and sore throat. Eyes:  Negative for pain and visual disturbance. Respiratory:  Negative for cough and shortness of breath. Cardiovascular:  Negative for chest pain and palpitations. Gastrointestinal:  Negative for abdominal pain and vomiting. Genitourinary:  Negative for dysuria and hematuria. Musculoskeletal:  Negative for arthralgias and back pain. Skin:  Negative for color change and rash. Neurological:  Negative for seizures and syncope. All other systems reviewed and are negative. Right Knee Exam     Tenderness   The patient is experiencing no tenderness. Range of Motion   The patient has normal right knee ROM. Other   Erythema: absent  Sensation: normal  Pulse: present  Swelling: mild    Comments: Well healing incisions  Clean, dry, and intact  No signs of infection             Physical Exam  Incisions are healing nicely range of motion within normal is no effusion no ecchymosis    Procedures  No Procedures performed today    I have personally reviewed pertinent films in PACS. Scribe Attestation      I,:  Kaye Rinne am acting as a scribe while in the presence of the attending physician.:       I,:  Katie Mtz, DO personally performed the services described in this documentation    as scribed in my presence.:             Portions of the record may have been created with voice recognition software. Occasional wrong word or "sound a like" substitutions may have occurred due to the inherent limitations of voice recognition software. Read the chart carefully and recognize, using context, where substitutions have occurred.

## 2023-12-07 ENCOUNTER — TELEPHONE (OUTPATIENT)
Dept: OBGYN CLINIC | Facility: CLINIC | Age: 68
End: 2023-12-07

## 2023-12-07 NOTE — TELEPHONE ENCOUNTER
Patient's  dropped claim denial paperwork in office today. Jemal Gardiner is requesting more information to accept the claim. Patient asked for phone call follow up,  can translate. Patient Call #[phone]    Call # [phone]     Paperwork is in a letter to team sent earlier today.

## 2023-12-18 ENCOUNTER — OFFICE VISIT (OUTPATIENT)
Dept: OBGYN CLINIC | Facility: CLINIC | Age: 68
End: 2023-12-18

## 2023-12-18 VITALS — BODY MASS INDEX: 31.61 KG/M2 | WEIGHT: 161 LBS | HEIGHT: 60 IN

## 2023-12-18 DIAGNOSIS — M22.2X1 PATELLOFEMORAL DISORDER OF RIGHT KNEE: ICD-10-CM

## 2023-12-18 DIAGNOSIS — M17.11 PATELLOFEMORAL ARTHRITIS OF RIGHT KNEE: ICD-10-CM

## 2023-12-18 DIAGNOSIS — Z98.890 STATUS POST ARTHROSCOPIC PARTIAL MEDIAL MENISCECTOMY OF RIGHT KNEE: Primary | ICD-10-CM

## 2023-12-18 DIAGNOSIS — Z87.828 STATUS POST ARTHROSCOPIC PARTIAL MEDIAL MENISCECTOMY OF RIGHT KNEE: Primary | ICD-10-CM

## 2023-12-18 PROCEDURE — 99024 POSTOP FOLLOW-UP VISIT: CPT | Performed by: ORTHOPAEDIC SURGERY

## 2023-12-18 NOTE — LETTER
December 18, 2023     Patient: Brenda Sargent  YOB: 1955  Date of Visit: 12/18/2023      To Whom it May Concern:    Brenda Sargent is under my professional care. Brenda was seen in my office on 12/18/2023. Brenda may return to work on 2/1/24 without restrictions .    If you have any questions or concerns, please don't hesitate to call.         Sincerely,          Bg Felder DO        CC: No Recipients

## 2023-12-18 NOTE — PROGRESS NOTES
Assessment:   Status Post  Knee Arthroscopy Partial Medial Meniscectomy - Right on 11/21/2023 right knee osteoarthritis    Plan:   Weight bearing: as tolerated  Pain control PRN  PT - will refer to PT  Consider further treatment of patellofemoral arthritis including CSI or visco in future if indicated    Follow Up:  PRN          CHIEF COMPLAINT:  Chief Complaint   Patient presents with   • Right Knee - Post-op         SUBJECTIVE:  Brenda Sargent is a 68 y.o. year old female who presents for follow up after Knee Arthroscopy Partial Medial Meniscectomy - Right. Today patient has mild pain and soreness overall doing better. Pt denies any fevers or chills. Denies any numbness or tingling.      PHYSICAL EXAMINATION:    MUSCULOSKELETAL EXAMINATION: righ tknee  General: Alert and oriented x 3, WNWD, NAD  Incision: healed  No jeannette-incisional erythema  no drainage or purulence  No fluctuance or swelling   Range of Motion: As expected  Neurovascular status: Sensation intact to light touch L1-S1   Motor intact L1-S1  Pulses: intact        STUDIES REVIEWED:  I have personally reviewed pertinent films in PACS.      PROCEDURES PERFORMED:  Procedures  No Procedures performed today

## 2023-12-26 ENCOUNTER — EVALUATION (OUTPATIENT)
Dept: PHYSICAL THERAPY | Facility: REHABILITATION | Age: 68
End: 2023-12-26
Payer: COMMERCIAL

## 2023-12-26 DIAGNOSIS — M22.2X1 PATELLOFEMORAL DISORDER OF RIGHT KNEE: ICD-10-CM

## 2023-12-26 DIAGNOSIS — M17.11 PATELLOFEMORAL ARTHRITIS OF RIGHT KNEE: ICD-10-CM

## 2023-12-26 DIAGNOSIS — Z87.828 STATUS POST ARTHROSCOPIC PARTIAL MEDIAL MENISCECTOMY OF RIGHT KNEE: ICD-10-CM

## 2023-12-26 DIAGNOSIS — Z98.890 STATUS POST ARTHROSCOPIC PARTIAL MEDIAL MENISCECTOMY OF RIGHT KNEE: ICD-10-CM

## 2023-12-26 PROCEDURE — 97530 THERAPEUTIC ACTIVITIES: CPT | Performed by: PHYSICAL THERAPIST

## 2023-12-26 PROCEDURE — 97110 THERAPEUTIC EXERCISES: CPT | Performed by: PHYSICAL THERAPIST

## 2023-12-26 PROCEDURE — 97112 NEUROMUSCULAR REEDUCATION: CPT | Performed by: PHYSICAL THERAPIST

## 2023-12-26 NOTE — PROGRESS NOTES
PT Re-Evaluation     Today's date: 2023  Patient name: Brenda Sargent  : 1955  MRN: [id number]  Referring provider: Preet Power PA-C  Dx:   Encounter Diagnosis     ICD-10-CM    1. Status post arthroscopic partial medial meniscectomy of right knee  Z98.890 Ambulatory Referral to Physical Therapy    Z87.828       2. Patellofemoral arthritis of right knee  M17.11 Ambulatory Referral to Physical Therapy      3. Patellofemoral disorder of right knee  M22.2X1 Ambulatory Referral to Physical Therapy                     Assessment  Assessment details: Patient presents to PT with R knee pain s/p menisectomy. Patient displays decreased knee ROM, knee strength and abnormal gait. These impairments are leading to pain with walkign, standing and stairs. Patient will benefit from skilled PT to address above impairments and help them return to PLOF.  Impairments: abnormal coordination, abnormal gait, abnormal muscle firing, abnormal or restricted ROM, abnormal movement, activity intolerance, impaired balance, impaired physical strength, lacks appropriate home exercise program, pain with function and weight-bearing intolerance  Barriers to therapy: Year history of pain  Understanding of Dx/Px/POC: good   Prognosis: good    Goals  STG  1. Patient will display decreased pain to 0-2 in 6 weeks  2. Patient will display knee ROM WNL in 6 weeks  3. Patient will display knee strength WNL in 6 weeks    LTG  1. Patient will be able to stand for 30 minutes without pain in 12 weeks  2. Patient will be able to walk for 30 minutes without pain in 12 weeks  3. Patient will be able to go up/down 3 flights of stairs without pain in 12 weeks      Plan  Patient would benefit from: PT eval and skilled physical therapy  Referral necessary: Yes  Planned modality interventions: thermotherapy: hydrocollator packs, manual electrical stimulation, TENS, electrical stimulation/Russian stimulation and cryotherapy  Planned therapy  interventions: activity modification, ADL training, balance, balance/weight bearing training, body mechanics training, coordination, flexibility, functional ROM exercises, gait training, graded activity, graded exercise, home exercise program, therapeutic training, therapeutic exercise, therapeutic activities, stretching, strengthening, neuromuscular re-education, motor coordination training, massage, manual therapy and joint mobilization  Frequency: 2x week  Duration in visits: 12  Duration in weeks: 6  Plan of Care beginning date: 2023  Plan of Care expiration date: 2024  Treatment plan discussed with: patient    Subjective Evaluation    History of Present Illness  Mechanism of injury: Patient had a menisectomy on . Patient is doing well. She is walking without AD. Patient still has some swelling and pain. Patient having pain with stairs but is able to walk and stand for longer periods of time. Patient isn't taking any pain meds. She is out of work until Feb for now          Not a recurrent problem   Quality of life: good    Patient Goals  Patient goals for therapy: decreased edema, decreased pain, increased motion, increased strength, improved balance and return to work    Pain  Current pain ratin  At best pain ratin  At worst pain ratin  Quality: sharp and tight  Relieving factors: ice and medications  Aggravating factors: standing, walking and stair climbing    Social Support  Steps to enter house: yes    Employment status: not working      Objective     Active Range of Motion     Right Knee   Flexion: 100 degrees with pain  Extension: 2 degrees with pain    Passive Range of Motion     Right Knee   Flexion: 100 degrees with pain  Extension: 0 degrees with pain    Strength/Myotome Testing     Right Knee   Flexion: 4  Extension: 4           Precautions: menisectomy 23      Manuals             PROM                                                    Neuro Re-Ed             Quad  set 3x10                                                                                          Ther Ex             SLR 3x10            Heel slides 3x10                                                                                          Ther Activity             Heel raises                          Gait Training                                       Modalities

## 2023-12-27 ENCOUNTER — OFFICE VISIT (OUTPATIENT)
Dept: PHYSICAL THERAPY | Facility: REHABILITATION | Age: 68
End: 2023-12-27
Payer: COMMERCIAL

## 2023-12-27 DIAGNOSIS — Z87.828 STATUS POST ARTHROSCOPIC PARTIAL MEDIAL MENISCECTOMY OF RIGHT KNEE: Primary | ICD-10-CM

## 2023-12-27 DIAGNOSIS — M17.11 PATELLOFEMORAL ARTHRITIS OF RIGHT KNEE: ICD-10-CM

## 2023-12-27 DIAGNOSIS — M22.2X1 PATELLOFEMORAL DISORDER OF RIGHT KNEE: ICD-10-CM

## 2023-12-27 DIAGNOSIS — Z98.890 STATUS POST ARTHROSCOPIC PARTIAL MEDIAL MENISCECTOMY OF RIGHT KNEE: Primary | ICD-10-CM

## 2023-12-27 PROCEDURE — 97530 THERAPEUTIC ACTIVITIES: CPT | Performed by: PHYSICAL THERAPIST

## 2023-12-27 PROCEDURE — 97112 NEUROMUSCULAR REEDUCATION: CPT | Performed by: PHYSICAL THERAPIST

## 2023-12-27 PROCEDURE — 97110 THERAPEUTIC EXERCISES: CPT | Performed by: PHYSICAL THERAPIST

## 2023-12-27 NOTE — PROGRESS NOTES
Daily Note     Today's date: 2023  Patient name: Brenda Sargent  : 1955  MRN: [id number]  Referring provider: Preet Power PA-C  Dx:   Encounter Diagnosis     ICD-10-CM    1. Status post arthroscopic partial medial meniscectomy of right knee  Z98.890     Z87.828       2. Patellofemoral arthritis of right knee  M17.11       3. Patellofemoral disorder of right knee  M22.2X1                      Subjective: patient states she would like to continue her exercises at home in the new year due to insurance deductible resetting      Objective: See treatment diary below      Assessment: Tolerated treatment well. Patient demonstrated fatigue post treatment, exhibited good technique with therapeutic exercises, and would benefit from continued PT Patient with improving tolerance to exercises. Updated HEP. Patient will continue at home and check in if needed      Plan: Continue per plan of care.      Precautions: menisectomy 23      Manuals            PROM                                                    Neuro Re-Ed             Quad set 3x10 3x10           bridges  3x10                                                                            Ther Ex             SLR 3x10 3x10           Heel slides 3x10 3x10           LAQ  3x10           Ham s  3x20s           Calf s  3x20s                                                  Ther Activity             Heel raises  3x10                        Gait Training                                       Modalities

## 2024-01-20 ENCOUNTER — HOSPITAL ENCOUNTER (EMERGENCY)
Facility: HOSPITAL | Age: 69
Discharge: HOME | End: 2024-01-20
Attending: INTERNAL MEDICINE | Admitting: INTERNAL MEDICINE
Payer: COMMERCIAL

## 2024-01-20 ENCOUNTER — APPOINTMENT (EMERGENCY)
Dept: RADIOLOGY | Facility: HOSPITAL | Age: 69
End: 2024-01-20
Payer: COMMERCIAL

## 2024-01-20 VITALS
HEART RATE: 75 BPM | OXYGEN SATURATION: 94 % | DIASTOLIC BLOOD PRESSURE: 70 MMHG | TEMPERATURE: 98.2 F | SYSTOLIC BLOOD PRESSURE: 113 MMHG | RESPIRATION RATE: 16 BRPM

## 2024-01-20 DIAGNOSIS — S40.011A CONTUSION OF RIGHT SHOULDER, INITIAL ENCOUNTER: ICD-10-CM

## 2024-01-20 DIAGNOSIS — S20.229A CONTUSION OF BACK, UNSPECIFIED LATERALITY, INITIAL ENCOUNTER: ICD-10-CM
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
## 2024-01-20 PROCEDURE — 99284 EMERGENCY DEPT VISIT MOD MDM: CPT | Performed by: INTERNAL MEDICINE

## 2024-01-20 PROCEDURE — 73030 X-RAY EXAM OF SHOULDER: CPT

## 2024-01-20 PROCEDURE — 96372 THER/PROPH/DIAG INJ SC/IM: CPT

## 2024-01-20 PROCEDURE — 99283 EMERGENCY DEPT VISIT LOW MDM: CPT

## 2024-01-20 PROCEDURE — 72100 X-RAY EXAM L-S SPINE 2/3 VWS: CPT

## 2024-01-20 RX ORDER — NAPROXEN 500 MG/1
500 TABLET ORAL 2 TIMES DAILY WITH MEALS
Qty: 30 TABLET | Refills: 0 | Status: SHIPPED | OUTPATIENT
Start: 2024-01-20

## 2024-01-20 RX ORDER — CYCLOBENZAPRINE HCL 10 MG
10 TABLET ORAL ONCE
Status: COMPLETED | OUTPATIENT
Start: 2024-01-20 | End: 2024-01-20

## 2024-01-20 RX ORDER — KETOROLAC TROMETHAMINE 30 MG/ML
30 INJECTION, SOLUTION INTRAMUSCULAR; INTRAVENOUS ONCE
Status: COMPLETED | OUTPATIENT
Start: 2024-01-20 | End: 2024-01-20

## 2024-01-20 RX ADMIN — KETOROLAC TROMETHAMINE 30 MG: 30 INJECTION, SOLUTION INTRAMUSCULAR; INTRAVENOUS at 10:10

## 2024-01-20 RX ADMIN — CYCLOBENZAPRINE 10 MG: 10 TABLET, FILM COATED ORAL at 10:10

## 2024-01-20 NOTE — DISCHARGE INSTRUCTIONS
Follow up with your PMD.  TAKE medications as prescribed  XR spine lumbar 2 or 3 views injury   ED Interpretation   XR LUMBAR spine; no osseous abnormalties      XR shoulder 2+ views RIGHT   ED Interpretation   XR R shoulder; degenerative changes, no fracture or dislocation.

## 2024-01-20 NOTE — ED PROVIDER NOTES
History  Chief Complaint   Patient presents with    Fall     Pt reports slipping on ice Thursday, landed on back on concrete. Pt c/o R shoulder pain, upper back pain, denies hitting head. Took percocet 0830      This is a 68 years old fell on the ice 2 days ago.  Patient fell on her back on the concrete and she sustained the pain of the low back, pain and ecchymosis at the right shoulder area.  Patient took 1 Percocet at home before she came.  Patient was found in the face today and the second day she started to feel the pain.  Patient came to the ER walking.  Patient able to move his right shoulder.  Patient denies hitting his head and denies LOC.  Patient has no CP, SOB.  Patient has history of chronic pain of the left shoulder.        Prior to Admission Medications   Prescriptions Last Dose Informant Patient Reported? Taking?   Aspirin Low Dose 81 MG EC tablet  Self Yes No   Sig: TOME JOY TABLETA TODOS LOS D AS EN LA MA MADIHA   Calcium + Vitamin D3 600-10 MG-MCG TABS  Self Yes No   Sig: TOME JOY TABLETA TODOS LOS D AS   alendronate (FOSAMAX) 70 mg tablet   Yes No   Sig: Take 70 mg by mouth every 7 days   atorvastatin (LIPITOR) 10 mg tablet  Self Yes No   Sig: TOME JOY TABLETA TODOS LOS D AS   ibuprofen (MOTRIN) 200 mg tablet   Yes No   Sig: Take by mouth every 6 (six) hours as needed for mild pain   meloxicam (MOBIC) 15 mg tablet  Self No No   Sig: Take 1 tablet (15 mg total) by mouth daily as needed for moderate pain   oxyCODONE-acetaminophen (PERCOCET) 5-325 mg per tablet   No No   Sig: Take 1 tablet by mouth every 6 (six) hours as needed for severe pain for up to 7 doses Max Daily Amount: 4 tablets   Patient not taking: Reported on 12/6/2023      Facility-Administered Medications: None       Past Medical History:   Diagnosis Date    Chronic pain     L shoulder    Hyperlipidemia        Past Surgical History:   Procedure Laterality Date    HYSTERECTOMY      MS ARTHRS KNE SURG W/MENISCECTOMY MED/LAT W/SHVG Right  11/21/2023    Procedure: KNEE ARTHROSCOPY PARTIAL MEDIAL MENISCECTOMY;  Surgeon: Bg Felder DO;  Location: [facility] MAIN OR;  Service: Orthopedics    SMALL INTESTINE SURGERY      VARICOSE VEIN SURGERY         Family History   Problem Relation Age of Onset    Lung cancer Mother     No Known Problems Father      I have reviewed and agree with the history as documented.    E-Cigarette/Vaping    E-Cigarette Use Never User      E-Cigarette/Vaping Substances    Nicotine No     THC No     CBD No     Flavoring No     Other No     Unknown No      Social History     Tobacco Use    Smoking status: Never    Smokeless tobacco: Never   Vaping Use    Vaping status: Never Used   Substance Use Topics    Alcohol use: Never    Drug use: Never       Review of Systems   Constitutional:  Negative for fatigue and fever.   HENT:  Negative for congestion, rhinorrhea, sinus pressure, sinus pain, sneezing and sore throat.    Respiratory:  Negative for cough and shortness of breath.    Cardiovascular:  Negative for chest pain and palpitations.   Gastrointestinal:  Negative for abdominal pain, diarrhea, nausea and vomiting.   Genitourinary:  Negative for difficulty urinating, dysuria, flank pain and frequency.   Musculoskeletal:  Positive for arthralgias and back pain. Negative for gait problem, joint swelling, myalgias, neck pain and neck stiffness.   Skin:  Negative for color change, pallor and rash.   Neurological:  Negative for dizziness, light-headedness and headaches.   Psychiatric/Behavioral:  Negative for agitation and behavioral problems.        Physical Exam  Physical Exam  Vitals and nursing note reviewed.   Constitutional:       General: She is not in acute distress.     Appearance: Normal appearance. She is well-developed. She is not ill-appearing, toxic-appearing or diaphoretic.   HENT:      Head: Normocephalic and atraumatic.      Right Ear: Ear canal normal.      Left Ear: Ear canal normal.      Nose: Nose normal.       Mouth/Throat:      Pharynx: No oropharyngeal exudate.   Cardiovascular:      Rate and Rhythm: Normal rate and regular rhythm.      Heart sounds: Normal heart sounds. No murmur heard.     No friction rub. No gallop.   Pulmonary:      Effort: Pulmonary effort is normal. No respiratory distress.      Breath sounds: Normal breath sounds. No wheezing.   Abdominal:      General: Bowel sounds are normal.      Palpations: Abdomen is soft.   Musculoskeletal:         General: Tenderness present. No swelling, deformity or signs of injury. Normal range of motion.      Cervical back: Normal range of motion and neck supple.      Right lower leg: No edema.      Left lower leg: No edema.      Comments: Examination of the back has mild tenderness at the lumbar spine.  There is also ecchymosis on the right shoulder area.  Patient able to move his right shoulder.  She has pain on lifting above 90 degrees.  Sensation of the shoulder is intact neurovascular intact.   Skin:     General: Skin is warm and dry.      Capillary Refill: Capillary refill takes less than 2 seconds.      Coloration: Skin is not jaundiced or pale.      Findings: Bruising present. No erythema, lesion or rash.      Comments: Ecchymosis at the back of the right shoulder.   Neurological:      General: No focal deficit present.      Mental Status: She is alert and oriented to person, place, and time.   Psychiatric:         Behavior: Behavior normal.         Vital Signs  ED Triage Vitals   Temperature Pulse Respirations Blood Pressure SpO2   01/20/24 0943 01/20/24 0941 01/20/24 0941 01/20/24 0942 01/20/24 0941   98.2 °F (36.8 °C) 75 16 113/70 94 %      Temp Source Heart Rate Source Patient Position - Orthostatic VS BP Location FiO2 (%)   01/20/24 0943 -- -- -- --   Oral          Pain Score       01/20/24 1010       9           Vitals:    01/20/24 0941 01/20/24 0942   BP:  113/70   Pulse: 75          Visual Acuity      ED Medications  Medications   ketorolac (TORADOL)  injection 30 mg (30 mg Intramuscular Given 1/20/24 1010)   cyclobenzaprine (FLEXERIL) tablet 10 mg (10 mg Oral Given 1/20/24 1010)       Diagnostic Studies  Results Reviewed       None                   XR spine lumbar 2 or 3 views injury   ED Interpretation by Estela Martínez MD (01/20 1037)   XR LUMBAR spine; no osseous abnormalties      Final Result by Francesco Oliveira MD (01/20 2736)   Age-appropriate degenerative change with L4 and L5 grade 1 degenerative anterolisthesis.      Workstation performed: [id number]         XR shoulder 2+ views RIGHT   ED Interpretation by Estela Martínez MD (01/20 1038)   XR R shoulder; degenerative changes, no fracture or dislocation.       Final Result by Francesco Oliveira MD (01/20 4099)      No acute osseous abnormality.      Degenerative changes as described.      Workstation performed: [id number]                    Procedures  Procedures         ED Course                                             Medical Decision Making  This is 68 years old came for having a fall 2 days ago.  Patient fell on the ice and she hit the concrete.  Patient has no LOC.  Patient came with low back pain and pain at the right shoulder.  Patient came walking.  Patient denies any urinary or rectal symptoms.  Patient has no numbness or tingling of lower extremities.  Shoulder up to 90 degrees abduction.  X-ray lumbar spine ; no osseous abnormalties.    x-ray right shoulder  ;degenerative changes, no fracture or dislocation.   Patient to be discharged on NSAID and asked her to apply an ice to the contused area.  All question concerns were patient has been fully addressed.    Amount and/or Complexity of Data Reviewed  Radiology: ordered and independent interpretation performed.     Details: X-ray lumbar spine; no osseous abnormalities.  X-ray right shoulder; degenerative changes, no fracture or dislocation    Risk  Prescription drug management.             Disposition  Final diagnoses:   Fall, initial  encounter   Contusion of back, unspecified laterality, initial encounter   Contusion of right shoulder, initial encounter     Time reflects when diagnosis was documented in both MDM as applicable and the Disposition within this note       Time User Action Codes Description Comment    1/20/2024 10:41 AM Estela Martínez Add [W19.XXXA] Fall, initial encounter     1/20/2024 10:41 AM Estela Martínez Add [S20.229A] Contusion of back, unspecified laterality, initial encounter     1/20/2024 10:41 AM Estela Martínez [S40.011A] Contusion of right shoulder, initial encounter           ED Disposition       ED Disposition   Discharge    Condition   Stable    Date/Time   Sat Jan 20, 2024 10:44 AM    Comment   Brenda Sargent discharge to home/self care.                   Follow-up Information       Follow up With Specialties Details Why Contact Info    [facility], Nurse Practitioner In 1 week  [address] [address]  [phone]              Discharge Medication List as of 1/20/2024 10:44 AM        START taking these medications    Details   naproxen (Naprosyn) 500 mg tablet Take 1 tablet (500 mg total) by mouth 2 (two) times a day with meals, Starting Sat 1/20/2024, Normal           CONTINUE these medications which have NOT CHANGED    Details   alendronate (FOSAMAX) 70 mg tablet Take 70 mg by mouth every 7 days, Historical Med      Aspirin Low Dose 81 MG EC tablet TOME JOY TABLETA TODOS LOS D AS EN LA MA MADIHA, Historical Med      atorvastatin (LIPITOR) 10 mg tablet TOME JOY TABLETA TODOS LOS D AS, Historical Med      Calcium + Vitamin D3 600-10 MG-MCG TABS TOME JOY TABLETA TODOS LOS D AS, Historical Med      ibuprofen (MOTRIN) 200 mg tablet Take by mouth every 6 (six) hours as needed for mild pain, Historical Med      oxyCODONE-acetaminophen (PERCOCET) 5-325 mg per tablet Take 1 tablet by mouth every 6 (six) hours as needed for severe pain for up to 7 doses Max Daily Amount: 4  tablets, Starting Tue 11/21/2023, Normal           STOP taking these medications       meloxicam (MOBIC) 15 mg tablet Comments:   Reason for Stopping:               No discharge procedures on file.    PDMP Review       None            ED Provider  Electronically Signed by             Estela Martínez MD  01/20/24 7454

## 2024-04-26 ENCOUNTER — APPOINTMENT (OUTPATIENT)
Dept: RADIOLOGY | Facility: AMBULARY SURGERY CENTER | Age: 69
End: 2024-04-26
Attending: ORTHOPAEDIC SURGERY
Payer: MEDICARE

## 2024-04-26 ENCOUNTER — OFFICE VISIT (OUTPATIENT)
Dept: OBGYN CLINIC | Facility: CLINIC | Age: 69
End: 2024-04-26
Payer: COMMERCIAL

## 2024-04-26 VITALS — HEIGHT: 60 IN | WEIGHT: 161 LBS | BODY MASS INDEX: 31.61 KG/M2

## 2024-04-26 DIAGNOSIS — M17.11 PRIMARY OSTEOARTHRITIS OF RIGHT KNEE: ICD-10-CM

## 2024-04-26 DIAGNOSIS — M17.12 PRIMARY OSTEOARTHRITIS OF LEFT KNEE: ICD-10-CM

## 2024-04-26 DIAGNOSIS — M17.11 PRIMARY OSTEOARTHRITIS OF RIGHT KNEE: Primary | ICD-10-CM

## 2024-04-26 DIAGNOSIS — M25.562 LEFT KNEE PAIN, UNSPECIFIED CHRONICITY: ICD-10-CM

## 2024-04-26 PROCEDURE — 73562 X-RAY EXAM OF KNEE 3: CPT

## 2024-04-26 PROCEDURE — 20610 DRAIN/INJ JOINT/BURSA W/O US: CPT

## 2024-04-26 PROCEDURE — 99213 OFFICE O/P EST LOW 20 MIN: CPT | Performed by: ORTHOPAEDIC SURGERY

## 2024-04-26 RX ORDER — BETAMETHASONE SODIUM PHOSPHATE AND BETAMETHASONE ACETATE 3; 3 MG/ML; MG/ML
6 INJECTION, SUSPENSION INTRA-ARTICULAR; INTRALESIONAL; INTRAMUSCULAR; SOFT TISSUE
Status: COMPLETED | OUTPATIENT
Start: 2024-04-26 | End: 2024-04-26

## 2024-04-26 RX ORDER — BUPIVACAINE HYDROCHLORIDE 2.5 MG/ML
2 INJECTION, SOLUTION INFILTRATION; PERINEURAL
Status: COMPLETED | OUTPATIENT
Start: 2024-04-26 | End: 2024-04-26

## 2024-04-26 RX ORDER — PREDNISONE 20 MG/1
TABLET ORAL
COMMUNITY

## 2024-04-26 RX ORDER — CYCLOBENZAPRINE HCL 10 MG
TABLET ORAL
COMMUNITY

## 2024-04-26 RX ORDER — MELOXICAM 15 MG/1
1 TABLET ORAL DAILY PRN
COMMUNITY

## 2024-04-26 RX ORDER — LIDOCAINE 50 MG/G
PATCH TOPICAL
COMMUNITY

## 2024-04-26 RX ADMIN — BUPIVACAINE HYDROCHLORIDE 2 ML: 2.5 INJECTION, SOLUTION INFILTRATION; PERINEURAL at 10:45

## 2024-04-26 RX ADMIN — BETAMETHASONE SODIUM PHOSPHATE AND BETAMETHASONE ACETATE 6 MG: 3; 3 INJECTION, SUSPENSION INTRA-ARTICULAR; INTRALESIONAL; INTRAMUSCULAR; SOFT TISSUE at 10:45

## 2024-04-26 NOTE — PROGRESS NOTES
"Assessment:  1. Primary osteoarthritis of right knee  XR knee 3 vw right non injury      2. Primary osteoarthritis of left knee  XR knee 3 vw left non injury        Patient Active Problem List   Diagnosis    Syncope    Hypokalemia    Generalized weakness    Knee pain, right    Complex tear of medial meniscus of right knee as current injury    Subchondral insufficiency fracture of condyle of right femur (HCC)    Subchondral insufficiency fracture of condyle of left femur (HCC)    Patellofemoral disorder of right knee    Status post arthroscopic partial medial meniscectomy of right knee    Primary osteoarthritis of right knee    Primary osteoarthritis of left knee       Plan:    68 y.o. female  with osteoarthritis of the bilateral knees      used through duration of the visit to discuss treatments.   Discussed x ray findings of the bilateral knees   Discussed steroid injections of bilateral knees. Discussed side effects and risks. Patient agreeable. Injection administered.   Discussed gel injections of bilateral knees. Patient notes 3/10 bilateral knee pain. Patient agreeable. Prior auth submitted today.   Patient emotional throughout visit out of proportion to pain.   Patient to follow up for course of gel injections.     The assessment and plan were formulated by Dr. Felder and I assisted in carrying it out.    Subjective:   Patient ID: Brenda Sargent is a 68 y.o. female .    HPI    Patient presents to the office for follow up of bilateral knee pain. Patient notes persistent pain in medial aspect of bilateral knees. Patient noted to be \"sad\" as her reason for her visit today.     The following portions of the patient's history were reviewed and updated as appropriate: allergies, current medications, past family history, past social history, past surgical history and problem list.    Social History     Socioeconomic History    Marital status: /Civil Union     Spouse name: Not on file    Number of " children: Not on file    Years of education: Not on file    Highest education level: Not on file   Occupational History    Not on file   Tobacco Use    Smoking status: Never    Smokeless tobacco: Never   Vaping Use    Vaping status: Never Used   Substance and Sexual Activity    Alcohol use: Never    Drug use: Never    Sexual activity: Not on file   Other Topics Concern    Not on file   Social History Narrative    Not on file     Social Determinants of Health     Financial Resource Strain: Not on file   Food Insecurity: Not on file   Transportation Needs: Not on file   Physical Activity: Not on file   Stress: Not on file   Social Connections: Not on file   Intimate Partner Violence: Not on file   Housing Stability: Not on file     Past Medical History:   Diagnosis Date    Chronic pain     L shoulder    Hyperlipidemia      Past Surgical History:   Procedure Laterality Date    HYSTERECTOMY      OR ARTHRS KNE SURG W/MENISCECTOMY MED/LAT W/SHVG Right 11/21/2023    Procedure: KNEE ARTHROSCOPY PARTIAL MEDIAL MENISCECTOMY;  Surgeon: Bg Felder DO;  Location: [facility] MAIN OR;  Service: Orthopedics    SMALL INTESTINE SURGERY      VARICOSE VEIN SURGERY       No Known Allergies  Current Outpatient Medications on File Prior to Visit   Medication Sig Dispense Refill    alendronate (FOSAMAX) 70 mg tablet Take 70 mg by mouth every 7 days      Aspirin Low Dose 81 MG EC tablet TOME JOY TABLETA TODOS LOS D AS EN LA MA MADIHA      atorvastatin (LIPITOR) 10 mg tablet TOME JOY TABLETA TODOS LOS D AS      Calcium + Vitamin D3 600-10 MG-MCG TABS TOME JOY TABLETA TODOS LOS D AS      cyclobenzaprine (FLEXERIL) 10 mg tablet TOME JOY TABLETA DOS VECES AL D A CUANDO SEA NECESARIO POR 10 D AS      ibuprofen (MOTRIN) 200 mg tablet Take by mouth every 6 (six) hours as needed for mild pain      lidocaine (LIDODERM) 5 % APPLY 1 PATCH BY TOPICAL ROUTE ONCE DAILY (MAY WEAR UP TO 12HOURS.)      meloxicam (MOBIC) 15 mg tablet Take 1 tablet by mouth daily  as needed      naproxen (Naprosyn) 500 mg tablet Take 1 tablet (500 mg total) by mouth 2 (two) times a day with meals 30 tablet 0    oxyCODONE-acetaminophen (PERCOCET) 5-325 mg per tablet Take 1 tablet by mouth every 6 (six) hours as needed for severe pain for up to 7 doses Max Daily Amount: 4 tablets (Patient not taking: Reported on 12/6/2023) 7 tablet 0    predniSONE 20 mg tablet TOME JOY TABLETA DOS VECES AL D A POR 5 D AS       No current facility-administered medications on file prior to visit.       Review of Systems   Constitutional:  Negative for chills and fever.   HENT:  Negative for ear pain and sore throat.    Eyes:  Negative for pain and visual disturbance.   Respiratory:  Negative for cough and shortness of breath.    Cardiovascular:  Negative for chest pain and palpitations.   Gastrointestinal:  Negative for abdominal pain and vomiting.   Genitourinary:  Negative for dysuria and hematuria.   Musculoskeletal:  Positive for arthralgias. Negative for back pain.   Skin:  Negative for color change and rash.   Neurological:  Negative for seizures and syncope.   All other systems reviewed and are negative.    See HPi    Objective:    There were no vitals filed for this visit.    Physical Exam  Vitals and nursing note reviewed.   Constitutional:       General: She is not in acute distress.     Appearance: She is well-developed.   HENT:      Head: Normocephalic and atraumatic.   Eyes:      Conjunctiva/sclera: Conjunctivae normal.   Cardiovascular:      Rate and Rhythm: Normal rate and regular rhythm.      Heart sounds: No murmur heard.  Pulmonary:      Effort: Pulmonary effort is normal. No respiratory distress.      Breath sounds: Normal breath sounds.   Abdominal:      Palpations: Abdomen is soft.      Tenderness: There is no abdominal tenderness.   Musculoskeletal:         General: No swelling.      Cervical back: Neck supple.      Right knee: No effusion.      Left knee: No effusion.   Skin:     General:  "Skin is warm and dry.      Capillary Refill: Capillary refill takes less than 2 seconds.   Neurological:      Mental Status: She is alert.   Psychiatric:         Mood and Affect: Mood normal.         Right Knee Exam     Tenderness   The patient is experiencing tenderness in the medial joint line.    Range of Motion   Extension:  normal   Flexion:  normal     Other   Effusion: no effusion present      Left Knee Exam     Tenderness   The patient is experiencing tenderness in the medial joint line.    Range of Motion   Extension:  normal   Flexion:  normal     Other   Effusion: no effusion present            I have personally reviewed pertinent films in PACS and my interpretation is xray bilateral knees reveals bilateral medial tibiofemoral arthritis consistent with Kellgren Grade 3.    Large joint arthrocentesis: bilateral knee  Universal Protocol:  Consent: Verbal consent obtained.  Risks and benefits: risks, benefits and alternatives were discussed  Consent given by: patient  Patient understanding: patient states understanding of the procedure being performed  Patient identity confirmed: verbally with patient  Supporting Documentation  Indications: pain   Procedure Details  Location: knee - bilateral knee  Needle size: 22 G  Ultrasound guidance: no  Approach: anterolateral    Medications (Right): 2 mL bupivacaine 0.25 %; 6 mg betamethasone acetate-betamethasone sodium phosphate 6 (3-3) mg/mLMedications (Left): 2 mL bupivacaine 0.25 %; 6 mg betamethasone acetate-betamethasone sodium phosphate 6 (3-3) mg/mL   Patient tolerance: patient tolerated the procedure well with no immediate complications  Dressing:  Sterile dressing applied                Portions of the record may have been created with voice recognition software.  Occasional wrong word or \"sound a like\" substitutions may have occurred due to the inherent limitations of voice recognition software.  Read the chart carefully and recognize, using context, where " substitutions have occurred.

## 2024-09-03 ENCOUNTER — HOSPITAL ENCOUNTER (OUTPATIENT)
Facility: HOSPITAL | Age: 69
Discharge: HOME | End: 2024-09-03
Payer: MEDICARE

## 2024-09-03 VITALS — BODY MASS INDEX: 31.61 KG/M2 | WEIGHT: 161 LBS | HEIGHT: 60 IN

## 2024-09-03 DIAGNOSIS — Z12.31 ENCOUNTER FOR SCREENING MAMMOGRAM FOR MALIGNANT NEOPLASM OF BREAST: ICD-10-CM

## 2024-09-03 PROCEDURE — 77063 BREAST TOMOSYNTHESIS BI: CPT

## 2024-09-03 PROCEDURE — 77067 SCR MAMMO BI INCL CAD: CPT

## 2025-07-24 ENCOUNTER — VBI (OUTPATIENT)
Dept: ADMINISTRATIVE | Facility: OTHER | Age: 70
End: 2025-07-24

## 2025-07-24 NOTE — TELEPHONE ENCOUNTER
07/24/25 1:24 PM     Chart reviewed for CRC: Colonoscopy ; nothing is submitted to the patient's insurance at this time.     Lilo Valenzuela   [facility]

## (undated) DEVICE — ABDOMINAL PAD: Brand: DERMACEA

## (undated) DEVICE — OCCLUSIVE GAUZE STRIP,3% BISMUTH TRIBROMOPHENATE IN PETROLATUM BLEND: Brand: XEROFORM

## (undated) DEVICE — PADDING CAST 4 IN  COTTON STRL

## (undated) DEVICE — SUT ETHILON 3-0 PS-1 18 IN 1663G

## (undated) DEVICE — TUBING ARTHROSCOPY REDUCE PATIENT

## (undated) DEVICE — CHLORAPREP HI-LITE 26ML ORANGE

## (undated) DEVICE — NEEDLE 23G X 1 1/2 SAFETY-GLIDE THIN WALL

## (undated) DEVICE — GLOVE SRG BIOGEL 7.5

## (undated) DEVICE — GLOVE INDICATOR PI UNDERGLOVE SZ 8 BLUE

## (undated) DEVICE — GAUZE SPONGES,16 PLY: Brand: CURITY

## (undated) DEVICE — BETHLEHEM UNIVERSAL  ARTHRO PK: Brand: CARDINAL HEALTH

## (undated) DEVICE — GLOVE SRG BIOGEL ECLIPSE 8

## (undated) DEVICE — ACE WRAP 6 IN STERILE

## (undated) DEVICE — TUBING ARTHROSCOPY REDUCE PUMP

## (undated) DEVICE — SYRINGE 10ML LL

## (undated) DEVICE — DECANTER: Brand: UNBRANDED

## (undated) DEVICE — IMPERVIOUS STOCKINETTE: Brand: DEROYAL

## (undated) DEVICE — ACE WRAP 4 IN STERILE

## (undated) DEVICE — INTENDED FOR TISSUE SEPARATION, AND OTHER PROCEDURES THAT REQUIRE A SHARP SURGICAL BLADE TO PUNCTURE OR CUT.: Brand: BARD-PARKER ® CARBON RIB-BACK BLADES

## (undated) DEVICE — VAPR 2.3 WEDGE ELECTRODE (SHORT) WITH INTEGRATED HANDPIECE, 85MM: Brand: VAPR